# Patient Record
Sex: FEMALE | Race: WHITE | Employment: UNEMPLOYED | URBAN - METROPOLITAN AREA
[De-identification: names, ages, dates, MRNs, and addresses within clinical notes are randomized per-mention and may not be internally consistent; named-entity substitution may affect disease eponyms.]

---

## 2017-03-06 ENCOUNTER — OFFICE VISIT (OUTPATIENT)
Dept: FAMILY MEDICINE CLINIC | Facility: CLINIC | Age: 67
End: 2017-03-06

## 2017-03-06 VITALS
OXYGEN SATURATION: 98 % | HEART RATE: 82 BPM | BODY MASS INDEX: 49.58 KG/M2 | RESPIRATION RATE: 18 BRPM | TEMPERATURE: 98.2 F | WEIGHT: 269.4 LBS | HEIGHT: 62 IN | DIASTOLIC BLOOD PRESSURE: 74 MMHG | SYSTOLIC BLOOD PRESSURE: 128 MMHG

## 2017-03-06 DIAGNOSIS — E66.01 OBESITY, CLASS III, BMI 40-49.9 (MORBID OBESITY) (HCC): ICD-10-CM

## 2017-03-06 DIAGNOSIS — R73.09 ELEVATED HEMOGLOBIN A1C: ICD-10-CM

## 2017-03-06 DIAGNOSIS — E78.1 HYPERTRIGLYCERIDEMIA: ICD-10-CM

## 2017-03-06 DIAGNOSIS — I10 ESSENTIAL HYPERTENSION: Primary | ICD-10-CM

## 2017-03-06 DIAGNOSIS — E55.9 VITAMIN D DEFICIENCY: ICD-10-CM

## 2017-03-06 RX ORDER — DEXTROMETHORPHAN HYDROBROMIDE, GUAIFENESIN 5; 100 MG/5ML; MG/5ML
1300 LIQUID ORAL
COMMUNITY
End: 2018-03-15 | Stop reason: SDUPTHER

## 2017-03-06 NOTE — MR AVS SNAPSHOT
Visit Information Date & Time Provider Department Dept. Phone Encounter #  
 3/6/2017  8:30 AM Jorge Alberto Pickett MD [facility] [phone] [id number] Follow-up Instructions Return in about 3 months (around 6/6/2017) for Follow up hypertension, Follow up hyperlipidemia, Medicare wellness physical, 30 minutes. Upcoming Health Maintenance Date Due  
 MEDICARE YEARLY EXAM 6/8/2017 GLAUCOMA SCREENING Q2Y 10/5/2017 COLONOSCOPY 12/31/2017 BREAST CANCER SCRN MAMMOGRAM 6/14/2018 DTaP/Tdap/Td series (2 - Td) 6/7/2026 Allergies as of 3/6/2017  Review Complete On: 3/6/2017 By: Jorge Alberto Pickett MD  
 No Known Allergies Current Immunizations  Never Reviewed Name Date Influenza Vaccine 9/8/2016, 10/19/2015, 10/23/2014 Influenza Vaccine (Quad) PF 9/29/2015  9:07 AM  
 Pneumococcal Conjugate (PCV-13) 9/8/2016 Pneumococcal Polysaccharide (PPSV-23) 10/23/2014 Not reviewed this visit You Were Diagnosed With   
  
 Codes Comments Essential hypertension    -  Primary ICD-10-CM: I10 
ICD-9-CM: 401.9 Hypertriglyceridemia     ICD-10-CM: E78.1 ICD-9-CM: 272.1 Obesity, Class III, BMI 40-49.9 (morbid obesity) (HCC)     ICD-10-CM: E66.01 
ICD-9-CM: 278.01 Vitamin D deficiency     ICD-10-CM: E55.9 ICD-9-CM: 268.9 Elevated hemoglobin A1c     ICD-10-CM: R73.09 
ICD-9-CM: 790.29 Vitals BP Pulse Temp Resp Height(growth percentile) Weight(growth percentile) 128/74 (BP 1 Location: Right arm, BP Patient Position: Sitting) 82 98.2 °F (36.8 °C) (Oral) 18 5' 2\" (1.575 m) 269 lb 6.4 oz (122.2 kg) SpO2 BMI OB Status Smoking Status 98% 49.27 kg/m2 Menopause Never Smoker BMI and BSA Data Body Mass Index Body Surface Area  
 49.27 kg/m 2 2.31 m 2 Preferred Pharmacy Pharmacy Name Phone [address] [place] [address] [phone] Your Updated Medication List  
  
   
 This list is accurate as of: 3/6/17  8:59 AM.  Always use your most recent med list.  
  
  
  
  
 acetaminophen 650 mg CR tablet Commonly known as:  TYLENOL  
1,300 mg. amLODIPine 5 mg tablet Commonly known as:  Rondall Venkata Take 1 Tab by mouth daily. candesartan 32 mg tablet Commonly known as:  ATACAND Take 1 Tab by mouth daily. metoprolol tartrate 50 mg tablet Commonly known as:  LOPRESSOR Take 1 Tab by mouth two (2) times a day. pravastatin 40 mg tablet Commonly known as:  PRAVACHOL Take 1 Tab by mouth nightly. warfarin 2.5 mg tablet Commonly known as:  COUMADIN Take 1 Tab by mouth daily. Follow-up Instructions Return in about 3 months (around 6/6/2017) for Follow up hypertension, Follow up hyperlipidemia, Medicare wellness physical, 30 minutes. To-Do List   
 03/06/2017 Lab:  HEMOGLOBIN A1C WITH EAG   
  
 03/06/2017 Lab:  T4, FREE   
  
 03/06/2017 Lab:  TSH 3RD GENERATION   
  
 03/06/2017 Lab:  VITAMIN D, 25 HYDROXY   
  
 03/09/2017 Lab:  CBC WITH AUTOMATED DIFF   
  
 03/09/2017 Lab:  LIPID PANEL   
  
 03/09/2017 Lab:  METABOLIC PANEL, COMPREHENSIVE Patient Instructions High Cholesterol: Care Instructions Your Care Instructions Cholesterol is a type of fat in your blood. It is needed for many body functions, such as making new cells. Cholesterol is made by your body. It also comes from food you eat. High cholesterol means that you have too much of the fat in your blood. This raises your risk of a heart attack and stroke. LDL and HDL are part of your total cholesterol. LDL is the \"bad\" cholesterol. High LDL can raise your risk for heart disease, heart attack, and stroke. HDL is the \"good\" cholesterol. It helps clear bad cholesterol from the body. High HDL is linked with a lower risk of heart disease, heart attack, and stroke. Your cholesterol levels help your doctor find out your risk for having a heart attack or stroke. You and your doctor can talk about whether you need to lower your risk and what treatment is best for you. A heart-healthy lifestyle along with medicines can help lower your cholesterol and your risk. The way you choose to lower your risk will depend on how high your risk is for heart attack and stroke. It will also depend on how you feel about taking medicines. Follow-up care is a key part of your treatment and safety. Be sure to make and go to all appointments, and call your doctor if you are having problems. It's also a good idea to know your test results and keep a list of the medicines you take. How can you care for yourself at home? · Eat a variety of foods every day. Good choices include fruits, vegetables, whole grains (like oatmeal), dried beans and peas, nuts and seeds, soy products (like tofu), and fat-free or low-fat dairy products. · Replace butter, margarine, and hydrogenated or partially hydrogenated oils with olive and canola oils. (Canola oil margarine without trans fat is fine.) · Replace red meat with fish, poultry, and soy protein (like tofu). · Limit processed and packaged foods like chips, crackers, and cookies. · Bake, broil, or steam foods. Don't garces them. · Be physically active. Get at least 30 minutes of exercise on most days of the week. Walking is a good choice. You also may want to do other activities, such as running, swimming, cycling, or playing tennis or team sports. · Stay at a healthy weight or lose weight by making the changes in eating and physical activity listed above. Losing just a small amount of weight, even 5 to 10 pounds, can reduce your risk for having a heart attack or stroke. · Do not smoke. When should you call for help? Watch closely for changes in your health, and be sure to contact your doctor if: 
· You need help making lifestyle changes. · You have questions about your medicine. Where can you learn more? Go to http://yaritza-lucy.info/. Enter E114 in the search box to learn more about \"High Cholesterol: Care Instructions. \" Current as of: January 27, 2016 Content Version: 11.1 © 1636-9377 [employer]. Care instructions adapted under license by [employer] (which disclaims liability or warranty for this information). If you have questions about a medical condition or this instruction, always ask your healthcare professional. [address] any warranty or liability for your use of this information. Please provide this summary of care documentation to your next provider. Your primary care clinician is listed as Alice Beasley. If you have any questions after today's visit, please call [phone].

## 2017-03-06 NOTE — PATIENT INSTRUCTIONS

## 2017-03-06 NOTE — PROGRESS NOTES
Internal Medicine Progress Note    Today's Date:  3/6/2017   Patient:  Aby Serbian  Patient :  1950    Subjective:     Chief Complaint   Patient presents with    Hypertension    Cholesterol Problem      Hypertension   This is a chronic problem. BP is at goal. Pt takes lopressor, norvasc and candesartan. Pt reports compliance with these medications. Hyperlipidemia  This is a chronic problem. TG is not at goal. Last FLP was checked on 2016. Pt takes pravastatin. ASCVD risk  is 9.4%. Atrial fibrillation  This is a chronic problem. This is at goal. Pt is on coumadin and a beta blocker. Pt was taken off amiodarone. Pt is seeing Dr. Letha Gutierrez and Dr. Yessenia Rogers. Pt is s/p cardiac ablation and cardioversion. Past Medical History:   Diagnosis Date    Advance directive discussed with patient 2016    Pt would like to appoint her son, Berhane Pina as her medical decision maker in the event that she can no longer do so. Phone number is [phone]. Her secondary person is her niece, Gabriella Mancini. Phone number is [phone]. If her death is imminent and medical treatment will not help her recover, pt does not want life prolonging measures. If her condition makes her unaware of h    Dizziness 5/3/2016    Hypertriglyceridemia 2016    Obesity, Class III, BMI 40-49.9 (morbid obesity) ([facility] [address] 2015    Osteoarthritis     Shingles 2015     History reviewed. No pertinent surgical history. reports that she has never smoked. She has never used smokeless tobacco. She reports that she does not drink alcohol or use illicit drugs. Family History   Problem Relation Age of Onset    Heart Disease Father      unknown, ? MI at age 46    Arthritis-osteo Sister      No Known Allergies  Review of Systems   Positives in bold  CV:      chest pain, palpitations  PULM:  SOB, wheezing, cough, sputum production    Current Outpatient Meds and Allergies     Current Outpatient Prescriptions on File Prior to Visit   Medication Sig Dispense Refill    amLODIPine (NORVASC) 5 mg tablet Take 1 Tab by mouth daily. 90 Tab 3    warfarin (COUMADIN) 2.5 mg tablet Take 1 Tab by mouth daily. 90 Tab 3    pravastatin (PRAVACHOL) 40 mg tablet Take 1 Tab by mouth nightly. 90 Tab 3    candesartan (ATACAND) 32 mg tablet Take 1 Tab by mouth daily. 90 Tab 3    metoprolol tartrate (LOPRESSOR) 50 mg tablet Take 1 Tab by mouth two (2) times a day. (Patient taking differently: Take 75 mg by mouth two (2) times a day.) 180 Tab 3     No current facility-administered medications on file prior to visit. No Known Allergies  Objective:     VS:    Visit Vitals    /74 (BP 1 Location: Right arm, BP Patient Position: Sitting)  Comment: manual    Pulse 82    Temp 98.2 °F (36.8 °C) (Oral)    Resp 18    Ht 5' 2\" (1.575 m)    Wt 269 lb 6.4 oz (122.2 kg)    SpO2 98%    BMI 49.27 kg/m2     General:   Well-nourished, well-groomed, pleasant, alert, in no acute distress  Head:  Normocephalic, atraumatic  Ears:  External ears WNL  Nose:  External nares WNL  Psych:  No pressured speech, no abnormal thought content    No visits with results within 3 Month(s) from this visit.   Latest known visit with results is:    Hospital Outpatient Visit on 12/06/2016   Component Date Value Ref Range Status    Sodium 12/06/2016 141  136 - 145 mmol/L Final    Potassium 12/06/2016 4.3  3.5 - 5.5 mmol/L Final    Chloride 12/06/2016 103  100 - 108 mmol/L Final    CO2 12/06/2016 30  21 - 32 mmol/L Final    Anion gap 12/06/2016 8  3.0 - 18 mmol/L Final    Glucose 12/06/2016 94  74 - 99 mg/dL Final    BUN 12/06/2016 14  7.0 - 18 MG/DL Final    Creatinine 12/06/2016 0.71  0.6 - 1.3 MG/DL Final    BUN/Creatinine ratio 12/06/2016 20  12 - 20   Final    GFR est AA 12/06/2016 >60  >60 ml/min/1.73m2 Final    GFR est non-AA 12/06/2016 >60  >60 ml/min/1.73m2 Final    Comment: (NOTE)  Estimated GFR is calculated using the Modification of Diet in Renal   Disease (MDRD) Study equation, reported for both  Americans   (GFRAA) and non- Americans (GFRNA), and normalized to 1.73m2   body surface area. The physician must decide which value applies to   the patient. The MDRD study equation should only be used in   individuals age 25 or older. It has not been validated for the   following: pregnant women, patients with serious comorbid conditions,   or on certain medications, or persons with extremes of body size,   muscle mass, or nutritional status.  Calcium 12/06/2016 9.3  8.5 - 10.1 MG/DL Final    Bilirubin, total 12/06/2016 0.6  0.2 - 1.0 MG/DL Final    ALT (SGPT) 12/06/2016 29  13 - 56 U/L Final    AST (SGOT) 12/06/2016 24  15 - 37 U/L Final    Alk. phosphatase 12/06/2016 81  45 - 117 U/L Final    Protein, total 12/06/2016 6.6  6.4 - 8.2 g/dL Final    Albumin 12/06/2016 3.8  3.4 - 5.0 g/dL Final    Globulin 12/06/2016 2.8  2.0 - 4.0 g/dL Final    A-G Ratio 12/06/2016 1.4  0.8 - 1.7   Final    LIPID PROFILE 12/06/2016        Final    Cholesterol, total 12/06/2016 208* <200 MG/DL Final    Triglyceride 12/06/2016 320* <150 MG/DL Final    HDL Cholesterol 12/06/2016 45  40 - 60 MG/DL Final    LDL, calculated 12/06/2016 99  0 - 100 MG/DL Final    VLDL, calculated 12/06/2016 64  MG/DL Final    CHOL/HDL Ratio 12/06/2016 4.6  0 - 5.0   Final     Assessment/Plan & Orders:         ICD-10-CM ICD-9-CM    1. Essential hypertension I10 401.9 CBC WITH AUTOMATED DIFF      TSH 3RD GENERATION      T4, FREE   2. Hypertriglyceridemia E78.1 272.1 LIPID PANEL      METABOLIC PANEL, COMPREHENSIVE   3. Obesity, Class III, BMI 40-49.9 (morbid obesity) (HCC) E66.01 278.01    4.  Vitamin D deficiency E55.9 268.9 VITAMIN D, 25 HYDROXY   5. Elevated hemoglobin A1c R73.09 790.29 HEMOGLOBIN A1C WITH EAG      Healthy lifestyle has been encouraged including avoidance of tobacco, limiting or avoiding alcohol intake, heart healthy diet which is low in cholesterol and saturated fat and contains fresh fruits, vegetables and whole grains and fiber, regular exercise with goals of 20-30 minutes 3-5 days weekly and maintaining an optimal BMI. Follow-up Disposition:  Return in about 3 months (around 6/6/2017) for Follow up hypertension, Follow up hyperlipidemia, Medicare wellness physical, 30 minutes. *Patient verbalized understanding and agreement with the plan. Patient was given an after-visit summary. Maria Del Carmen Schuler.  Anil Amezcua MD - Internal Medicine  3/6/2017, 9:38 AM  [facility]  [address] [place] [address] [place], [address]  Phone [phone]  Fax [phone]

## 2017-03-06 NOTE — PROGRESS NOTES
Kevin Brewster is a 75 y.o. female presents today for follow up on her hypertension and cholesterol. She would also like to discuss medication changes from cardiologist. Patient is fasting. Pt is in Room # 2        1. Have you been to the ER, urgent care clinic since your last visit? Hospitalized since your last visit? No    2. Have you seen or consulted any other health care providers outside of the [address] [place] [address] since your last visit? Include any pap smears or colon screening.  Yes When: last month Where: Dr. Rios Monday and Dr. Tracy Austin Reason for visit: routine check- medication changes with cardiologist

## 2017-06-13 ENCOUNTER — OFFICE VISIT (OUTPATIENT)
Dept: FAMILY MEDICINE CLINIC | Facility: CLINIC | Age: 67
End: 2017-06-13

## 2017-06-13 ENCOUNTER — HOSPITAL ENCOUNTER (OUTPATIENT)
Dept: LAB | Age: 67
Discharge: HOME | End: 2017-06-13
Payer: MEDICARE

## 2017-06-13 VITALS
OXYGEN SATURATION: 95 % | DIASTOLIC BLOOD PRESSURE: 70 MMHG | HEIGHT: 62 IN | SYSTOLIC BLOOD PRESSURE: 135 MMHG | BODY MASS INDEX: 46.74 KG/M2 | WEIGHT: 254 LBS | TEMPERATURE: 98.8 F | RESPIRATION RATE: 18 BRPM | HEART RATE: 67 BPM

## 2017-06-13 DIAGNOSIS — Z78.0 POSTMENOPAUSAL: ICD-10-CM

## 2017-06-13 DIAGNOSIS — E78.1 HYPERTRIGLYCERIDEMIA: ICD-10-CM

## 2017-06-13 DIAGNOSIS — Z23 NEED FOR SHINGLES VACCINE: ICD-10-CM

## 2017-06-13 DIAGNOSIS — Z12.11 COLON CANCER SCREENING: ICD-10-CM

## 2017-06-13 DIAGNOSIS — J02.9 PHARYNGITIS, UNSPECIFIED ETIOLOGY: ICD-10-CM

## 2017-06-13 DIAGNOSIS — E66.9 OBESITY, UNSPECIFIED OBESITY SEVERITY, UNSPECIFIED OBESITY TYPE: ICD-10-CM

## 2017-06-13 DIAGNOSIS — I10 ESSENTIAL HYPERTENSION: ICD-10-CM

## 2017-06-13 DIAGNOSIS — Z00.00 ROUTINE GENERAL MEDICAL EXAMINATION AT A HEALTH CARE FACILITY: Primary | ICD-10-CM

## 2017-06-13 DIAGNOSIS — R53.83 FATIGUE, UNSPECIFIED TYPE: ICD-10-CM

## 2017-06-13 DIAGNOSIS — I48.20 CHRONIC ATRIAL FIBRILLATION (HCC): ICD-10-CM

## 2017-06-13 LAB
25(OH)D3 SERPL-MCNC: 44.2 NG/ML (ref 30–100)
ALBUMIN SERPL BCP-MCNC: 3.9 G/DL (ref 3.4–5)
ALBUMIN/GLOB SERPL: 1.3 {RATIO} (ref 0.8–1.7)
ALP SERPL-CCNC: 78 U/L (ref 45–117)
ALT SERPL-CCNC: 25 U/L (ref 13–56)
ANION GAP BLD CALC-SCNC: 8 MMOL/L (ref 3–18)
AST SERPL W P-5'-P-CCNC: 20 U/L (ref 15–37)
BASOPHILS # BLD AUTO: 0 K/UL (ref 0–0.06)
BASOPHILS # BLD: 0 % (ref 0–2)
BILIRUB SERPL-MCNC: 0.6 MG/DL (ref 0.2–1)
BUN SERPL-MCNC: 11 MG/DL (ref 7–18)
BUN/CREAT SERPL: 16 (ref 12–20)
CALCIUM SERPL-MCNC: 8.6 MG/DL (ref 8.5–10.1)
CHLORIDE SERPL-SCNC: 107 MMOL/L (ref 100–108)
CHOLEST SERPL-MCNC: 180 MG/DL
CO2 SERPL-SCNC: 27 MMOL/L (ref 21–32)
CREAT SERPL-MCNC: 0.7 MG/DL (ref 0.6–1.3)
DIFFERENTIAL METHOD BLD: ABNORMAL
EOSINOPHIL # BLD: 0.1 K/UL (ref 0–0.4)
EOSINOPHIL NFR BLD: 1 % (ref 0–5)
ERYTHROCYTE [DISTWIDTH] IN BLOOD BY AUTOMATED COUNT: 13.8 % (ref 11.6–14.5)
EST. AVERAGE GLUCOSE BLD GHB EST-MCNC: 105 MG/DL
GLOBULIN SER CALC-MCNC: 2.9 G/DL (ref 2–4)
GLUCOSE SERPL-MCNC: 96 MG/DL (ref 74–99)
HBA1C MFR BLD: 5.3 % (ref 4.2–5.6)
HCT VFR BLD AUTO: 44.4 % (ref 35–45)
HDLC SERPL-MCNC: 44 MG/DL (ref 40–60)
HDLC SERPL: 4.1 {RATIO} (ref 0–5)
HGB BLD-MCNC: 14.6 G/DL (ref 12–16)
INR BLD: 2.3
LDLC SERPL CALC-MCNC: 105 MG/DL (ref 0–100)
LIPID PROFILE,FLP: ABNORMAL
LYMPHOCYTES # BLD AUTO: 18 % (ref 21–52)
LYMPHOCYTES # BLD: 1.5 K/UL (ref 0.9–3.6)
MCH RBC QN AUTO: 31.1 PG (ref 24–34)
MCHC RBC AUTO-ENTMCNC: 32.9 G/DL (ref 31–37)
MCV RBC AUTO: 94.5 FL (ref 74–97)
MONOCYTES # BLD: 0.7 K/UL (ref 0.05–1.2)
MONOCYTES NFR BLD AUTO: 9 % (ref 3–10)
NEUTS SEG # BLD: 5.8 K/UL (ref 1.8–8)
NEUTS SEG NFR BLD AUTO: 72 % (ref 40–73)
PLATELET # BLD AUTO: 321 K/UL (ref 135–420)
PMV BLD AUTO: 10.3 FL (ref 9.2–11.8)
POTASSIUM SERPL-SCNC: 4 MMOL/L (ref 3.5–5.5)
PROT SERPL-MCNC: 6.8 G/DL (ref 6.4–8.2)
PT POC: NORMAL SECONDS
RBC # BLD AUTO: 4.7 M/UL (ref 4.2–5.3)
S PYO AG THROAT QL: NEGATIVE
SODIUM SERPL-SCNC: 142 MMOL/L (ref 136–145)
TRIGL SERPL-MCNC: 155 MG/DL (ref ?–150)
TSH SERPL DL<=0.05 MIU/L-ACNC: 0.98 UIU/ML (ref 0.36–3.74)
VALID INTERNAL CONTROL?: YES
VALID INTERNAL CONTROL?: YES
VLDLC SERPL CALC-MCNC: 31 MG/DL
WBC # BLD AUTO: 8 K/UL (ref 4.6–13.2)

## 2017-06-13 PROCEDURE — 83036 HEMOGLOBIN GLYCOSYLATED A1C: CPT | Performed by: PHYSICIAN ASSISTANT

## 2017-06-13 PROCEDURE — 84443 ASSAY THYROID STIM HORMONE: CPT | Performed by: PHYSICIAN ASSISTANT

## 2017-06-13 PROCEDURE — 84439 ASSAY OF FREE THYROXINE: CPT | Performed by: PHYSICIAN ASSISTANT

## 2017-06-13 PROCEDURE — 80061 LIPID PANEL: CPT | Performed by: PHYSICIAN ASSISTANT

## 2017-06-13 PROCEDURE — 85025 COMPLETE CBC W/AUTO DIFF WBC: CPT | Performed by: PHYSICIAN ASSISTANT

## 2017-06-13 PROCEDURE — 80053 COMPREHEN METABOLIC PANEL: CPT | Performed by: PHYSICIAN ASSISTANT

## 2017-06-13 PROCEDURE — 36415 COLL VENOUS BLD VENIPUNCTURE: CPT | Performed by: PHYSICIAN ASSISTANT

## 2017-06-13 PROCEDURE — 82306 VITAMIN D 25 HYDROXY: CPT | Performed by: PHYSICIAN ASSISTANT

## 2017-06-13 RX ORDER — AMOXICILLIN 500 MG/1
500 CAPSULE ORAL 2 TIMES DAILY
Qty: 20 CAP | Refills: 0 | Status: SHIPPED | OUTPATIENT
Start: 2017-06-13 | End: 2017-06-23

## 2017-06-13 NOTE — PROGRESS NOTES
Lary Acuna is a 75 y.o. female presents today for sore throat for 2 days. Learning Assessment (baseline): Completed  Depression Screening: Completed    1. Have you been to the ER, urgent care clinic since your last visit? Hospitalized since your last visit? No    2. Have you seen or consulted any other health care providers outside of the [address] [place] [address] since your last visit? Include any pap smears or colon screening. No     Health Maintenance reviewed - sick visit today.

## 2017-06-13 NOTE — MR AVS SNAPSHOT
Visit Information Date & Time Provider Department Dept. Phone Encounter #  
 6/13/2017  9:15 AM Jared Dickey MARY [facility] [phone] [id number] Follow-up Instructions Return in about 3 months (around 9/13/2017) for routine care with Dr. Sonia Shannon. Your Appointments 7/13/2017  9:30 AM  
Follow Up with Rosa Collier MD  
[facility]) Appt Note: FOLLOW-UP; pt r/s 06/06/17 appt  syb 04/17/17; [address] [facility] [phone]  
[address] [place] [address] Upcoming Health Maintenance Date Due  
 MEDICARE YEARLY EXAM 6/8/2017 INFLUENZA AGE 9 TO ADULT 8/1/2017 GLAUCOMA SCREENING Q2Y 10/5/2017 COLONOSCOPY 12/31/2017 BREAST CANCER SCRN MAMMOGRAM 6/14/2018 DTaP/Tdap/Td series (2 - Td) 6/7/2026 Allergies as of 6/13/2017  Review Complete On: 3/6/2017 By: Rosa Collier MD  
 No Known Allergies Current Immunizations  Never Reviewed Name Date Influenza Vaccine 9/8/2016, 10/19/2015, 10/23/2014 Influenza Vaccine (Quad) PF 9/29/2015  9:07 AM  
 Pneumococcal Conjugate (PCV-13) 9/8/2016 Pneumococcal Polysaccharide (PPSV-23) 10/23/2014 Not reviewed this visit You Were Diagnosed With   
  
 Codes Comments Essential hypertension    -  Primary ICD-10-CM: I10 
ICD-9-CM: 401.9 Hypertriglyceridemia     ICD-10-CM: E78.1 ICD-9-CM: 272.1 Chronic atrial fibrillation (HCC)     ICD-10-CM: A45.8 ICD-9-CM: 427.31 Colon cancer screening     ICD-10-CM: Z12.11 ICD-9-CM: V76.51 Need for shingles vaccine     ICD-10-CM: T20 ICD-9-CM: V04.89 Pharyngitis, unspecified etiology     ICD-10-CM: J02.9 ICD-9-CM: 873 Routine general medical examination at a health care facility     ICD-10-CM: Z00.00 ICD-9-CM: V70.0 Fatigue, unspecified type     ICD-10-CM: R53.83 ICD-9-CM: 780.79   
 Postmenopausal     ICD-10-CM: Z78.0 ICD-9-CM: V49.81 Obesity, unspecified obesity severity, unspecified obesity type     ICD-10-CM: E66.9 ICD-9-CM: 278.00 Vitals BP Pulse Temp Resp Height(growth percentile) Weight(growth percentile) 135/70 (BP 1 Location: Left arm, BP Patient Position: Sitting) 67 98.8 °F (37.1 °C) (Oral) 18 5' 2\" (1.575 m) 254 lb (115.2 kg) SpO2 BMI OB Status Smoking Status 95% 46.46 kg/m2 Menopause Never Smoker Vitals History BMI and BSA Data Body Mass Index Body Surface Area  
 46.46 kg/m 2 2.24 m 2 Preferred Pharmacy Pharmacy Name Phone [address] [place] [address] [phone] Your Updated Medication List  
  
   
This list is accurate as of: 17  9:39 AM.  Always use your most recent med list.  
  
  
  
  
 acetaminophen 650 mg CR tablet Commonly known as:  TYLENOL  
1,300 mg. amLODIPine 5 mg tablet Commonly known as:  Eneida Coop Take 1 Tab by mouth daily. amoxicillin 500 mg capsule Commonly known as:  AMOXIL Take 1 Cap by mouth two (2) times a day for 10 days. candesartan 32 mg tablet Commonly known as:  ATACAND Take 1 Tab by mouth daily. metoprolol tartrate 50 mg tablet Commonly known as:  LOPRESSOR Take 1 Tab by mouth two (2) times a day. pravastatin 40 mg tablet Commonly known as:  PRAVACHOL Take 1 Tab by mouth nightly. varicella zoster vacine live 19,400 unit/0.65 mL Susr injection Commonly known as:  ZOSTAVAX  
1 Vial by SubCUTAneous route once for 1 dose. warfarin 2.5 mg tablet Commonly known as:  COUMADIN Take 1 Tab by mouth daily. Prescriptions Printed Refills  
 varicella zoster vacine live (ZOSTAVAX) 19,400 unit/0.65 mL susr injection 0 Si Vial by SubCUTAneous route once for 1 dose. Class: Print  Route: SubCUTAneous  
 amoxicillin (AMOXIL) 500 mg capsule 0  
 Sig: Take 1 Cap by mouth two (2) times a day for 10 days. Class: Print Route: Oral  
  
We Performed the Following REFERRAL FOR COLONOSCOPY [ZCT724 Custom] Comments:  
 colonoscopy Follow-up Instructions Return in about 3 months (around 9/13/2017) for routine care with Dr. Freya Valadez. To-Do List   
 06/13/2017 Lab:  CBC WITH AUTOMATED DIFF   
  
 06/13/2017 Lab:  HEMOGLOBIN A1C WITH EAG   
  
 06/13/2017 Lab:  LIPID PANEL   
  
 06/13/2017 Lab:  METABOLIC PANEL, COMPREHENSIVE   
  
 06/13/2017 Lab:  T4, FREE   
  
 06/13/2017 Lab:  TSH 3RD GENERATION   
  
 06/13/2017 Lab:  VITAMIN D, 25 HYDROXY Referral Information Referral ID Referred By Referred To  
  
 [id number] Ricki Yancey MD   
   [address] [place] [address] Phone: [phone] Fax: [phone] Visits Status Start Date End Date 1 New Request 6/13/17 6/13/18 If your referral has a status of pending review or denied, additional information will be sent to support the outcome of this decision. Patient Instructions Atrial Fibrillation: Care Instructions Your Care Instructions Atrial fibrillation is an irregular and often fast heartbeat. Treating this condition is important for several reasons. It can cause blood clots, which can travel from your heart to your brain and cause a stroke. If you have a fast heartbeat, you may feel lightheaded, dizzy, and weak. An irregular heartbeat can also increase your risk for heart failure. Atrial fibrillation is often the result of another heart condition, such as high blood pressure or coronary artery disease. Making changes to improve your heart condition will help you stay healthy and active. Follow-up care is a key part of your treatment and safety.  Be sure to make and go to all appointments, and call your doctor if you are having problems. It's also a good idea to know your test results and keep a list of the medicines you take. How can you care for yourself at home? Medicines · Take your medicines exactly as prescribed. Call your doctor if you think you are having a problem with your medicine. You will get more details on the specific medicines your doctor prescribes. · If your doctor has given you a blood thinner to prevent a stroke, be sure you get instructions about how to take your medicine safely. Blood thinners can cause serious bleeding problems. · Do not take any vitamins, over-the-counter drugs, or herbal products without talking to your doctor first. 
Lifestyle changes · Do not smoke. Smoking can increase your chance of a stroke and heart attack. If you need help quitting, talk to your doctor about stop-smoking programs and medicines. These can increase your chances of quitting for good. · Eat a heart-healthy diet. · Stay at a healthy weight. Lose weight if you need to. · Limit alcohol to 2 drinks a day for men and 1 drink a day for women. Too much alcohol can cause health problems. · Avoid colds and flu. Get a pneumococcal vaccine shot. If you have had one before, ask your doctor whether you need another dose. Get a flu shot every year. If you must be around people with colds or flu, wash your hands often. Activity · If your doctor recommends it, get more exercise. Walking is a good choice. Bit by bit, increase the amount you walk every day. Try for at least 30 minutes on most days of the week. You also may want to swim, bike, or do other activities. Your doctor may suggest that you join a cardiac rehabilitation program so that you can have help increasing your physical activity safely. · Start light exercise if your doctor says it is okay. Even a small amount will help you get stronger, have more energy, and manage stress. Walking is an easy way to get exercise.  Start out by walking a little more than you did in the hospital. Gradually increase the amount you walk. · When you exercise, watch for signs that your heart is working too hard. You are pushing too hard if you cannot talk while you are exercising. If you become short of breath or dizzy or have chest pain, sit down and rest immediately. · Check your pulse regularly. Place two fingers on the artery at the palm side of your wrist, in line with your thumb. If your heartbeat seems uneven or fast, talk to your doctor. When should you call for help? Call 911 anytime you think you may need emergency care. For example, call if: 
· You have symptoms of a heart attack. These may include: ¨ Chest pain or pressure, or a strange feeling in the chest. 
¨ Sweating. ¨ Shortness of breath. ¨ Nausea or vomiting. ¨ Pain, pressure, or a strange feeling in the back, neck, jaw, or upper belly or in one or both shoulders or arms. ¨ Lightheadedness or sudden weakness. ¨ A fast or irregular heartbeat. After you call 911, the  may tell you to chew 1 adult-strength or 2 to 4 low-dose aspirin. Wait for an ambulance. Do not try to drive yourself. · You have symptoms of a stroke. These may include: 
¨ Sudden numbness, tingling, weakness, or loss of movement in your face, arm, or leg, especially on only one side of your body. ¨ Sudden vision changes. ¨ Sudden trouble speaking. ¨ Sudden confusion or trouble understanding simple statements. ¨ Sudden problems with walking or balance. ¨ A sudden, severe headache that is different from past headaches. · You passed out (lost consciousness). Call your doctor now or seek immediate medical care if: 
· You have new or increased shortness of breath. · You feel dizzy or lightheaded, or you feel like you may faint. · Your heart rate becomes irregular. · You can feel your heart flutter in your chest or skip heartbeats. Tell your doctor if these symptoms are new or worse. Watch closely for changes in your health, and be sure to contact your doctor if you have any problems. Where can you learn more? Go to http://yaritza-lucy.info/. Enter U020 in the search box to learn more about \"Atrial Fibrillation: Care Instructions. \" Current as of: January 27, 2016 Content Version: 11.2 © 6776-5160 [employer]. Care instructions adapted under license by [employer] (which disclaims liability or warranty for this information). If you have questions about a medical condition or this instruction, always ask your healthcare professional. [address] any warranty or liability for your use of this information. Please provide this summary of care documentation to your next provider. Your primary care clinician is listed as Marilin Betty. If you have any questions after today's visit, please call [phone].

## 2017-06-13 NOTE — PATIENT INSTRUCTIONS
Atrial Fibrillation: Care Instructions  Your Care Instructions    Atrial fibrillation is an irregular and often fast heartbeat. Treating this condition is important for several reasons. It can cause blood clots, which can travel from your heart to your brain and cause a stroke. If you have a fast heartbeat, you may feel lightheaded, dizzy, and weak. An irregular heartbeat can also increase your risk for heart failure. Atrial fibrillation is often the result of another heart condition, such as high blood pressure or coronary artery disease. Making changes to improve your heart condition will help you stay healthy and active. Follow-up care is a key part of your treatment and safety. Be sure to make and go to all appointments, and call your doctor if you are having problems. It's also a good idea to know your test results and keep a list of the medicines you take. How can you care for yourself at home? Medicines  · Take your medicines exactly as prescribed. Call your doctor if you think you are having a problem with your medicine. You will get more details on the specific medicines your doctor prescribes. · If your doctor has given you a blood thinner to prevent a stroke, be sure you get instructions about how to take your medicine safely. Blood thinners can cause serious bleeding problems. · Do not take any vitamins, over-the-counter drugs, or herbal products without talking to your doctor first.  Lifestyle changes  · Do not smoke. Smoking can increase your chance of a stroke and heart attack. If you need help quitting, talk to your doctor about stop-smoking programs and medicines. These can increase your chances of quitting for good. · Eat a heart-healthy diet. · Stay at a healthy weight. Lose weight if you need to. · Limit alcohol to 2 drinks a day for men and 1 drink a day for women. Too much alcohol can cause health problems. · Avoid colds and flu. Get a pneumococcal vaccine shot.  If you have had one before, ask your doctor whether you need another dose. Get a flu shot every year. If you must be around people with colds or flu, wash your hands often. Activity  · If your doctor recommends it, get more exercise. Walking is a good choice. Bit by bit, increase the amount you walk every day. Try for at least 30 minutes on most days of the week. You also may want to swim, bike, or do other activities. Your doctor may suggest that you join a cardiac rehabilitation program so that you can have help increasing your physical activity safely. · Start light exercise if your doctor says it is okay. Even a small amount will help you get stronger, have more energy, and manage stress. Walking is an easy way to get exercise. Start out by walking a little more than you did in the hospital. Gradually increase the amount you walk. · When you exercise, watch for signs that your heart is working too hard. You are pushing too hard if you cannot talk while you are exercising. If you become short of breath or dizzy or have chest pain, sit down and rest immediately. · Check your pulse regularly. Place two fingers on the artery at the palm side of your wrist, in line with your thumb. If your heartbeat seems uneven or fast, talk to your doctor. When should you call for help? Call 911 anytime you think you may need emergency care. For example, call if:  · You have symptoms of a heart attack. These may include:  ¨ Chest pain or pressure, or a strange feeling in the chest.  ¨ Sweating. ¨ Shortness of breath. ¨ Nausea or vomiting. ¨ Pain, pressure, or a strange feeling in the back, neck, jaw, or upper belly or in one or both shoulders or arms. ¨ Lightheadedness or sudden weakness. ¨ A fast or irregular heartbeat. After you call 911, the  may tell you to chew 1 adult-strength or 2 to 4 low-dose aspirin. Wait for an ambulance. Do not try to drive yourself. · You have symptoms of a stroke.  These may include:  ¨ Sudden numbness, tingling, weakness, or loss of movement in your face, arm, or leg, especially on only one side of your body. ¨ Sudden vision changes. ¨ Sudden trouble speaking. ¨ Sudden confusion or trouble understanding simple statements. ¨ Sudden problems with walking or balance. ¨ A sudden, severe headache that is different from past headaches. · You passed out (lost consciousness). Call your doctor now or seek immediate medical care if:  · You have new or increased shortness of breath. · You feel dizzy or lightheaded, or you feel like you may faint. · Your heart rate becomes irregular. · You can feel your heart flutter in your chest or skip heartbeats. Tell your doctor if these symptoms are new or worse. Watch closely for changes in your health, and be sure to contact your doctor if you have any problems. Where can you learn more? Go to http://yaritza-lucy.info/. Enter U020 in the search box to learn more about \"Atrial Fibrillation: Care Instructions. \"  Current as of: January 27, 2016  Content Version: 11.2  © 6851-5866 [employer]. Care instructions adapted under license by [employer] (which disclaims liability or warranty for this information). If you have questions about a medical condition or this instruction, always ask your healthcare professional. [address] any warranty or liability for your use of this information.

## 2017-06-13 NOTE — PROGRESS NOTES
This is a Subsequent Medicare Annual Wellness Visit providing Personalized Prevention Plan Services (PPPS) (Performed 12 months after initial AWV and PPPS )    I have reviewed the patient's medical history in detail and updated the computerized patient record. History     Past Medical History:   Diagnosis Date    Advance directive discussed with patient 6/7/2016    Pt would like to appoint her son, Sierra Dimas as her medical decision maker in the event that she can no longer do so. Phone number is [phone]. Her secondary person is her niece, Jessica Clark. Phone number is [phone]. If her death is imminent and medical treatment will not help her recover, pt does not want life prolonging measures. If her condition makes her unaware of h    Dizziness 5/3/2016    Hypertriglyceridemia 9/6/2016    Obesity, Class III, BMI 40-49.9 (morbid obesity) ([facility] [address] 6/29/2015    Osteoarthritis     Shingles 9/29/2015      History reviewed. No pertinent surgical history. Current Outpatient Prescriptions   Medication Sig Dispense Refill    varicella zoster vacine live (ZOSTAVAX) 19,400 unit/0.65 mL susr injection 1 Vial by SubCUTAneous route once for 1 dose. 0.65 mL 0    amoxicillin (AMOXIL) 500 mg capsule Take 1 Cap by mouth two (2) times a day for 10 days. 20 Cap 0    acetaminophen (TYLENOL) 650 mg CR tablet 1,300 mg.      amLODIPine (NORVASC) 5 mg tablet Take 1 Tab by mouth daily. 90 Tab 3    warfarin (COUMADIN) 2.5 mg tablet Take 1 Tab by mouth daily. 90 Tab 3    pravastatin (PRAVACHOL) 40 mg tablet Take 1 Tab by mouth nightly. 90 Tab 3    candesartan (ATACAND) 32 mg tablet Take 1 Tab by mouth daily. 90 Tab 3    metoprolol tartrate (LOPRESSOR) 50 mg tablet Take 1 Tab by mouth two (2) times a day. (Patient taking differently: Take 75 mg by mouth two (2) times a day.) 180 Tab 3     No Known Allergies  Family History   Problem Relation Age of Onset    Heart Disease Father      unknown, ? MI at age 46    Arthritis-osteo Sister      Social History   Substance Use Topics    Smoking status: Never Smoker    Smokeless tobacco: Never Used    Alcohol use No     Patient Active Problem List   Diagnosis Code    Aortic valve stenosis I35.0    Atrial fibrillation (HCC) I48.91    Hypertension I10    Obesity, Class III, BMI 40-49.9 (morbid obesity) [facility] E66.01    Osteoarthritis M19.90    Advance directive discussed with patient Z70.80    Hypertriglyceridemia E78.1       Depression Risk Factor Screening:     PHQ over the last two weeks 6/13/2017   PHQ Not Done Active Diagnosis of Depression or Bipolar Disorder   Little interest or pleasure in doing things Not at all   Feeling down, depressed or hopeless Not at all   Total Score PHQ 2 0     Alcohol Risk Factor Screening:   Does not drink      Functional Ability and Level of Safety:     Hearing Loss   none    Activities of Daily Living   Self-care. Requires assistance with: no ADLs    Fall Risk     Fall Risk Assessment, last 12 mths 6/13/2017   Able to walk? Yes   Fall in past 12 months?  No     Abuse Screen   Patient is not abused    Review of Systems   A comprehensive review of systems was negative except for: Ears, nose, mouth, throat, and face: positive for sore throat    Physical Examination     Evaluation of Cognitive Function:  Mood/affect:  neutral, happy  Appearance: age appropriate and casually dressed  Family member/caregiver input: none present    Visit Vitals    /70 (BP 1 Location: Left arm, BP Patient Position: Sitting)    Pulse 67    Temp 98.8 °F (37.1 °C) (Oral)    Resp 18    Ht 5' 2\" (1.575 m)    Wt 254 lb (115.2 kg)    SpO2 95%    BMI 46.46 kg/m2     Card: 2/6 murmur, RRR no rubs or galluips  Pulm: CTAB    Patient Care Team:  Tara Essex, MD as PCP - General (Internal Medicine)  Ravinder Motta MD (Cardiology)  Mateo Villalobos DO (Cardiothoracic Surgery)    Advice/Referrals/Counseling   Education and counseling provided:  Are appropriate based on today's review and evaluation  End-of-Life planning (with patient's consent)      Glaucoma Screening- UTD  Pneumonia Vaccine- UTD  Shingles Vaccine- is due, script given  Tdap Vaccine- is due, declined  Colonoscopy- is needed; referral placed  Mammogram- is needed; scheduled for this week  DEXA- patient declines to have another done  Advance Directive- on file; still valid per patient    Assessment/Plan       ICD-10-CM ICD-9-CM    1. Routine general medical examination at a health care facility Z00.00 V70.0    2. Essential hypertension X41 264.6 METABOLIC PANEL, COMPREHENSIVE   3. Hypertriglyceridemia E78.1 272.1 LIPID PANEL   4. Chronic atrial fibrillation (HCC) I48.2 427.31 AMB POC PT/INR   5. Pharyngitis, unspecified etiology J02.9 462 amoxicillin (AMOXIL) 500 mg capsule      AMB POC RAPID STREP A   6. Fatigue, unspecified type R53.83 780.79 CBC WITH AUTOMATED DIFF      TSH 3RD GENERATION      T4, FREE   7. Postmenopausal Z78.0 V49.81 VITAMIN D, 25 HYDROXY   8. Obesity, unspecified obesity severity, unspecified obesity type E66.9 278.00 HEMOGLOBIN A1C WITH EAG   9. Colon cancer screening Z12.11 V76.51 REFERRAL FOR COLONOSCOPY   10. Need for shingles vaccine Z23 V04.89 varicella zoster vacine live (ZOSTAVAX) 19,400 unit/0.65 mL susr injection     Follow-up Disposition:  Return in about 3 months (around 9/13/2017) for routine care with Dr. Hyun Simon. .      Patient and I had a discussion and agreed on a 5 year plan for her for health screening and maintenance.       JOSHUA Winter

## 2017-06-13 NOTE — PROGRESS NOTES
History and Physical    Patient: Theo Clancy MRN: [id number]  SSN: [id number]    YOB: 1950  Age: 75 y.o. Sex: female      Subjective:      Theo Clancy is a 75 y.o. female who presents today for follow up HTN, HLD, h/o afib etc.    In terms of her HTN, she is on metoprolol, norvasc and candesartan. BP wnl today. In terms of her HLD, she is on pravastatin. Reports compliance. In terms of her afib, she sees cardiology. On coumadin. Is s/p cardiac ablation and cardioversion. Coumadin managed by coumadin clinic. Patient also has a c/o 2 day h/o right sided sore throat with tender lymph nodes under her chin. Throat is painful to swallow. Denies known ill contacts. Last Colonoscopy: 2007- referral placed today  Last DEXA:  7/2014-patient declined further testing  Last Mammogram: 6/16-Bi-RADS 1- has appt this week  Last PAP:  Past screening age      PMH:  Past Medical History:   Diagnosis Date    Advance directive discussed with patient 6/7/2016    Pt would like to appoint her son, Kalen Sylvester as her medical decision maker in the event that she can no longer do so. Phone number is [phone]. Her secondary person is her niece, Alvina Snider. Phone number is [phone]. If her death is imminent and medical treatment will not help her recover, pt does not want life prolonging measures. If her condition makes her unaware of h    Dizziness 5/3/2016    Hypertriglyceridemia 9/6/2016    Obesity, Class III, BMI 40-49.9 (morbid obesity) ([facility] [address] 6/29/2015    Osteoarthritis     Shingles 9/29/2015     History reviewed. No pertinent surgical history. FamHx:  Family History   Problem Relation Age of Onset    Heart Disease Father      unknown, ? MI at age 46    [de-identified] Sister        SocialHx:  Social History   Substance Use Topics    Smoking status: Never Smoker    Smokeless tobacco: Never Used    Alcohol use No        Meds:  Prior to Admission medications    Medication Sig Start Date End Date Taking? Authorizing Provider   varicella zoster rob live (ZOSTAVAX) 19,400 unit/0.65 mL susr injection 1 Vial by SubCUTAneous route once for 1 dose. 6/13/17 6/13/17 Yes Jessica Pumphrey V, PA   amoxicillin (AMOXIL) 500 mg capsule Take 1 Cap by mouth two (2) times a day for 10 days. 6/13/17 6/23/17 Yes Jessica Pumphrey V, PA   acetaminophen (TYLENOL) 650 mg CR tablet 1,300 mg. Yes Historical Provider   amLODIPine (NORVASC) 5 mg tablet Take 1 Tab by mouth daily. 12/6/16  Yes Suleman Montgomery MD   warfarin (COUMADIN) 2.5 mg tablet Take 1 Tab by mouth daily. 12/6/16  Yes Suleman Montgomery MD   pravastatin (PRAVACHOL) 40 mg tablet Take 1 Tab by mouth nightly. 12/6/16  Yes Suleman Montgomery MD   candesartan (ATACAND) 32 mg tablet Take 1 Tab by mouth daily. 12/6/16  Yes Suleman Montgomery MD   metoprolol tartrate (LOPRESSOR) 50 mg tablet Take 1 Tab by mouth two (2) times a day. Patient taking differently: Take 75 mg by mouth two (2) times a day.  12/6/16  Yes Suleman Montgomery MD        Allergies:  No Known Allergies    Review of Systems:  Items in Bold are positive  Constitutional: negative for fevers, chills and malaise  Eyes: negative for visual disturbance  Ears, Nose, Mouth, Throat, and Face: right sided sore throat, negative for nasal congestion  Neck: tender, swollen glands under right side of chin  Respiratory: negative for cough or SOB  Cardiovascular: negative for chest pain, chest pressure/discomfort  Gastrointestinal: negative for nausea, vomiting, melena, diarrhea, constipation and abdominal pain  Genitourinary:negative for frequency, dysuria or hematuria  Musculoskeletal:negative for myalgias and arthralgias  Neurological: negative for headaches, dizziness and paresthesia    Objective:     Visit Vitals    /70 (BP 1 Location: Left arm, BP Patient Position: Sitting)    Pulse 67    Temp 98.8 °F (37.1 °C) (Oral)    Resp 18    Ht 5' 2\" (1.575 m)    Wt 254 lb (115.2 kg)    SpO2 95%    BMI 46.46 kg/m2       Physical Exam:  GENERAL: alert, cooperative, no distress, appears stated age  [de-identified]: EYE: conjunctivae/corneas clear. PERRL, EOM's intact. EAR: TM's pearly gray bilaterally NOSE: Nasal mucosa pink and moist bilaterally, THROAT: right sided erythema, no exudate, no tonsillar hypertrophy  THYROID: no  thyromegaly  NECK:  right tender submandibular and submental adenopathy  LUNG: clear to auscultation bilaterally  HEART: irregularly irregular, 2/6 murmur, normal rate, S1, S2 normal, no  click, rub or gallop  ABDOMEN: soft, non-tender. Bowel sounds normal. No masses  EXTREMITIES:  extremities normal, atraumatic, no cyanosis or edema  NEUROLOGIC: AOx3. Gait normal.    Labs  POC Rapid Strep: negative    Lab Results   Component Value Date/Time    INR, External 2.40 05/20/2016    INR POC 2.3 06/13/2017 11:00 AM         Assessment and Plan:       ICD-10-CM ICD-9-CM    1. Routine general medical examination at a health care facility Z00.00 V70.0    2. Essential hypertension E64 693.7 METABOLIC PANEL, COMPREHENSIVE   3. Hypertriglyceridemia E78.1 272.1 LIPID PANEL   4. Chronic atrial fibrillation (HCC) I48.2 427.31 AMB POC PT/INR   5. Pharyngitis, unspecified etiology J02.9 462 amoxicillin (AMOXIL) 500 mg capsule      AMB POC RAPID STREP A   6. Fatigue, unspecified type R53.83 780.79 CBC WITH AUTOMATED DIFF      TSH 3RD GENERATION      T4, FREE   7. Postmenopausal Z78.0 V49.81 VITAMIN D, 25 HYDROXY   8. Obesity, unspecified obesity severity, unspecified obesity type E66.9 278.00 HEMOGLOBIN A1C WITH EAG   9. Colon cancer screening Z12.11 V76.51 REFERRAL FOR COLONOSCOPY   10.  Need for shingles vaccine Z23 V04.89 varicella zoster vacine live (ZOSTAVAX) 19,400 unit/0.65 mL susr injection         Medical Decision Making:  HTN- continue current therapy    HTG- labs- needs follow up    Afib- being managed by coumadin clinic and cardiology- patient advised to follow up with coumadin clinic regarding being on ABX    Pharyngitis- start amoxicillin- patient advised to follow up with coumadin clinic for repeat INR once done with ABX    Follow-up Disposition:  Return in about 3 months (around 9/13/2017) for routine care with Dr. Royanne Barthel. Patient acknowledges understanding of instructions and acknowledges understanding to call back if current symptoms worsen or new symptoms arise. Patient acknowledges and agrees with plan.         Signed By: JOSHUA Howard     June 13, 2017

## 2017-06-14 ENCOUNTER — TELEPHONE (OUTPATIENT)
Dept: FAMILY MEDICINE CLINIC | Facility: CLINIC | Age: 67
End: 2017-06-14

## 2017-06-14 NOTE — PROGRESS NOTES
Please advise labs show elevated triglycerides and bad cholesterol, recommend low fat diet and OTC fish oil  T4 still pending, will contact patient if abnormal

## 2017-06-14 NOTE — TELEPHONE ENCOUNTER
received a call from Mike Rashid from [facility] lab stating code for hemoglobin A1C is not covered, please change and re-ordered lab with corrected diagnosis code.

## 2017-06-15 ENCOUNTER — TELEPHONE (OUTPATIENT)
Dept: FAMILY MEDICINE CLINIC | Facility: CLINIC | Age: 67
End: 2017-06-15

## 2017-06-15 LAB — T4 FREE SERPL-MCNC: 1.2 NG/DL (ref 0.7–1.5)

## 2017-06-16 NOTE — TELEPHONE ENCOUNTER
Spoke with pt in regards to lab results. Two pt identifier's and permission to release verified. Relayed PAJasson's notes. Pt acknowledges understanding and voices no concerns at this time.

## 2017-09-13 ENCOUNTER — OFFICE VISIT (OUTPATIENT)
Dept: FAMILY MEDICINE CLINIC | Facility: CLINIC | Age: 67
End: 2017-09-13

## 2017-09-13 VITALS
WEIGHT: 251 LBS | SYSTOLIC BLOOD PRESSURE: 124 MMHG | OXYGEN SATURATION: 97 % | TEMPERATURE: 97.4 F | HEIGHT: 62 IN | HEART RATE: 82 BPM | RESPIRATION RATE: 12 BRPM | BODY MASS INDEX: 46.19 KG/M2 | DIASTOLIC BLOOD PRESSURE: 71 MMHG

## 2017-09-13 DIAGNOSIS — Z12.11 SCREEN FOR COLON CANCER: ICD-10-CM

## 2017-09-13 DIAGNOSIS — I10 ESSENTIAL HYPERTENSION: Primary | ICD-10-CM

## 2017-09-13 DIAGNOSIS — Z13.31 SCREENING FOR DEPRESSION: ICD-10-CM

## 2017-09-13 DIAGNOSIS — I48.20 CHRONIC ATRIAL FIBRILLATION (HCC): ICD-10-CM

## 2017-09-13 DIAGNOSIS — I35.0 AORTIC VALVE STENOSIS, UNSPECIFIED ETIOLOGY: ICD-10-CM

## 2017-09-13 NOTE — PROGRESS NOTES
Chief Complaint   Patient presents with    Hypertension    Cholesterol Problem    Irregular Heart Beat     Patient in room # 2. Patient is fasting. 1. Have you been to the ER, urgent care clinic since your last visit? Hospitalized since your last visit? No    2. Have you seen or consulted any other health care providers outside of the [address] [place] [address] since your last visit? Include any pap smears or colon screening. No     Reviewed.

## 2017-09-13 NOTE — PATIENT INSTRUCTIONS

## 2017-09-13 NOTE — MR AVS SNAPSHOT
Visit Information Date & Time Provider Department Dept. Phone Encounter #  
 9/13/2017  9:30 AM Maxwell Burns MD Donell Vásquez [phone] [id number] Follow-up Instructions Return in about 6 months (around 3/13/2018) for Follow up hypertension. Upcoming Health Maintenance Date Due  
 GLAUCOMA SCREENING Q2Y 10/5/2017 COLONOSCOPY 12/31/2017 MEDICARE YEARLY EXAM 6/14/2018 BREAST CANCER SCRN MAMMOGRAM 6/15/2019 DTaP/Tdap/Td series (2 - Td) 6/7/2026 Allergies as of 9/13/2017  Review Complete On: 9/13/2017 By: Maxwell Burns MD  
 No Known Allergies Current Immunizations  Reviewed on 6/20/2017 Name Date Influenza Vaccine 9/8/2017, 9/8/2016, 10/19/2015, 10/23/2014 Influenza Vaccine (Quad) PF 9/29/2015  9:07 AM  
 Pneumococcal Conjugate (PCV-13) 9/8/2016 Pneumococcal Polysaccharide (PPSV-23) 10/23/2014 Zoster Vaccine, Live 6/13/2017 Not reviewed this visit You Were Diagnosed With   
  
 Codes Comments Essential hypertension    -  Primary ICD-10-CM: I10 
ICD-9-CM: 401.9 Chronic atrial fibrillation (HCC)     ICD-10-CM: I51.3 ICD-9-CM: 427.31 Aortic valve stenosis, unspecified etiology     ICD-10-CM: I35.0 ICD-9-CM: 424.1 Screen for colon cancer     ICD-10-CM: Z12.11 ICD-9-CM: V76.51 Screening for depression     ICD-10-CM: Z13.89 ICD-9-CM: V79.0 Vitals BP Pulse Temp Resp Height(growth percentile) Weight(growth percentile) 124/71 (BP 1 Location: Right arm, BP Patient Position: Sitting) 82 97.4 °F (36.3 °C) (Oral) 12 5' 2\" (1.575 m) 251 lb (113.9 kg) SpO2 BMI OB Status Smoking Status 97% 45.91 kg/m2 Menopause Never Smoker Vitals History BMI and BSA Data Body Mass Index Body Surface Area 45.91 kg/m 2 2.23 m 2 Preferred Pharmacy Pharmacy Name Phone [address] [place] [address] [phone] Your Updated Medication List  
  
   
 This list is accurate as of: 9/13/17  9:50 AM.  Always use your most recent med list.  
  
  
  
  
 acetaminophen 650 mg CR tablet Commonly known as:  TYLENOL  
1,300 mg. amLODIPine 5 mg tablet Commonly known as:  [place] Cordial Take 1 Tab by mouth daily. candesartan 32 mg tablet Commonly known as:  ATACAND Take 1 Tab by mouth daily. FLUAD 0953-1821 (65 YR UP)(PF) Syrg injection Generic drug:  influenza vaccine 2017-18 (65 yrs+)(PF)  
inject 0.5 milliliter intramuscularly  
  
 metoprolol tartrate 50 mg tablet Commonly known as:  LOPRESSOR Take 1 Tab by mouth two (2) times a day. pravastatin 40 mg tablet Commonly known as:  PRAVACHOL Take 1 Tab by mouth nightly. warfarin 2.5 mg tablet Commonly known as:  COUMADIN Take 1 Tab by mouth daily. We Performed the Following [place] [address] [[id number] [facility]] REFERRAL TO GASTROENTEROLOGY [XJZ94 Custom] Follow-up Instructions Return in about 6 months (around 3/13/2018) for Follow up hypertension. Referral Information Referral ID Referred By Referred To  
  
 [id number] Luis Eduardo Degroot [address] [place] [address] [facility] [address] Jarrod Grossman Phone: [phone] Fax: [phone] Visits Status Start Date End Date 1 New Request 9/13/17 9/13/18 If your referral has a status of pending review or denied, additional information will be sent to support the outcome of this decision. Patient Instructions Medicare Wellness Visit, Female The best way to live healthy is to have a healthy lifestyle by eating a well-balanced diet, exercising regularly, limiting alcohol and stopping smoking. Regular physical exams and screening tests are another way to keep healthy. Preventive exams provided by your health care provider can find health problems before they become diseases or illnesses.  Preventive services including immunizations, screening tests, monitoring and exams can help you take care of your own health. All people over age 72 should have a pneumovax  and and a prevnar shot to prevent pneumonia. These are once in a lifetime unless you and your provider decide differently. All people over 65 should have a yearly flu shot and a tetanus vaccine every 10 years. A bone mass density to screen for osteoporosis or thinning of the bones should be done every 2 years after 65. Screening for diabetes mellitus with a blood sugar test should be done every year. Glaucoma is a disease of the eye due to increased ocular pressure that can lead to blindness and it should be done every year by an eye professional. 
 
Cardiovascular screening tests that check for elevated lipids (fatty part of blood) which can lead to heart disease and strokes should be done every 5 years. Colorectal screening that evaluates for blood or polyps in your colon should be done yearly as a stool test or every five years as a flexible sigmoidoscope or every 10 years as a colonoscopy up to age 76. Breast cancer screening with a mammogram is recommended biennially  for women age 54-69. Screening for cervical cancer with a pap smear and pelvic exam is recommended for women after age 72 years every 2 years up to age 79 or when the provider and patient decide to stop. If there is a history of cervical abnormalities or other increased risk for cancer then the test is recommended yearly. Hepatitis C screening is also recommended for anyone born between 80 through [address]. A shingles vaccine is also recommended once in a lifetime after age 61. Your Medicare Wellness Exam is recommended annually. Here is a list of your current Health Maintenance items with a due date: 
Health Maintenance Due Topic Date Due  Glaucoma Screening   10/05/2017  Colonoscopy  12/31/2017 Introducing [facility] SERVICES! Dear Erna Keane: Thank you for requesting a [employer] account. Our records indicate that you have previously registered for a [employer] account but its currently inactive. Please call our [employer] support line at [phone]. Additional Information If you have questions, please visit the Frequently Asked Questions section of the [employer] website at [email or web address] Remember, [employer] is NOT to be used for urgent needs. For medical emergencies, dial 911. Now available from your iPhone and Android! Please provide this summary of care documentation to your next provider. Your primary care clinician is listed as Wellington Samson. If you have any questions after today's visit, please call [phone].

## 2017-09-13 NOTE — PROGRESS NOTES
Internal Medicine Progress Note    Today's Date:  2017   Patient:  Juanita Escalante  Patient :  1950    Subjective:     Chief Complaint   Patient presents with    Hypertension    Cholesterol Problem    Irregular Heart Beat      Hypertension   This is a chronic problem. BP is at goal. Pt takes lopressor, norvasc and candesartan. Pt reports compliance with these medications. Hyperlipidemia  This is a chronic problem. TG is improved. Last FLP was checked on 2017. Pt takes pravastatin. Atrial fibrillation  This is a chronic problem. This is at goal. Pt is on coumadin and a beta blocker. Pt was taken off amiodarone. Pt is seeing Dr. Albert Sullivan and Dr. Maya Yuen. Pt is s/p cardiac ablation and cardioversion. Past Medical History:   Diagnosis Date    Advance directive discussed with patient 2016    Pt would like to appoint her son, Marques Peace as her medical decision maker in the event that she can no longer do so. Phone number is [phone]. Her secondary person is her niece, Nigel Zaidi. Phone number is [phone]. If her death is imminent and medical treatment will not help her recover, pt does not want life prolonging measures. If her condition makes her unaware of h    Dizziness 5/3/2016    Hypertriglyceridemia 2016    Obesity, Class III, BMI 40-49.9 (morbid obesity) ([facility] [address] 2015    Osteoarthritis     Shingles 2015     History reviewed. No pertinent surgical history. reports that she has never smoked. She has never used smokeless tobacco. She reports that she does not drink alcohol or use illicit drugs. Family History   Problem Relation Age of Onset    Heart Disease Father      unknown, ? MI at age 46    Arthritis-osteo Sister      No Known Allergies  Review of Systems   Positives in bold  CV:      chest pain, palpitations  PULM:  SOB, wheezing, cough, sputum production    Current Outpatient Meds and Allergies     Current Outpatient Prescriptions on File Prior to Visit   Medication Sig Dispense Refill    acetaminophen (TYLENOL) 650 mg CR tablet 1,300 mg.      amLODIPine (NORVASC) 5 mg tablet Take 1 Tab by mouth daily. 90 Tab 3    warfarin (COUMADIN) 2.5 mg tablet Take 1 Tab by mouth daily. 90 Tab 3    pravastatin (PRAVACHOL) 40 mg tablet Take 1 Tab by mouth nightly. 90 Tab 3    candesartan (ATACAND) 32 mg tablet Take 1 Tab by mouth daily. 90 Tab 3    metoprolol tartrate (LOPRESSOR) 50 mg tablet Take 1 Tab by mouth two (2) times a day. (Patient taking differently: Take 75 mg by mouth two (2) times a day.) 180 Tab 3     No current facility-administered medications on file prior to visit. No Known Allergies  Objective:     VS:    Visit Vitals    /71 (BP 1 Location: Right arm, BP Patient Position: Sitting)    Pulse 82    Temp 97.4 °F (36.3 °C) (Oral)    Resp 12    Ht 5' 2\" (1.575 m)    Wt 251 lb (113.9 kg)    SpO2 97%    BMI 45.91 kg/m2     General:   Well-nourished, well-groomed, pleasant, alert, in no acute distress  Head:  Normocephalic, atraumatic  Ears:  External ears WNL  Nose:  External nares WNL  Psych:  No pressured speech, no abnormal thought content    No visits with results within 3 Month(s) from this visit.   Latest known visit with results is:    Hospital Outpatient Visit on 06/13/2017   Component Date Value Ref Range Status    WBC 06/13/2017 8.0  4.6 - 13.2 K/uL Final    RBC 06/13/2017 4.70  4.20 - 5.30 M/uL Final    HGB 06/13/2017 14.6  12.0 - 16.0 g/dL Final    HCT 06/13/2017 44.4  35.0 - 45.0 % Final    MCV 06/13/2017 94.5  74.0 - 97.0 FL Final    MCH 06/13/2017 31.1  24.0 - 34.0 PG Final    MCHC 06/13/2017 32.9  31.0 - 37.0 g/dL Final    RDW 06/13/2017 13.8  11.6 - 14.5 % Final    PLATELET 36/77/3790 347  135 - 420 K/uL Final    MPV 06/13/2017 10.3  9.2 - 11.8 FL Final    NEUTROPHILS 06/13/2017 72  40 - 73 % Final    LYMPHOCYTES 06/13/2017 18* 21 - 52 % Final    MONOCYTES 06/13/2017 9  3 - 10 % Final    EOSINOPHILS 06/13/2017 1  0 - 5 % Final    BASOPHILS 06/13/2017 0  0 - 2 % Final    ABS. NEUTROPHILS 06/13/2017 5.8  1.8 - 8.0 K/UL Final    ABS. LYMPHOCYTES 06/13/2017 1.5  0.9 - 3.6 K/UL Final    ABS. MONOCYTES 06/13/2017 0.7  0.05 - 1.2 K/UL Final    ABS. EOSINOPHILS 06/13/2017 0.1  0.0 - 0.4 K/UL Final    ABS. BASOPHILS 06/13/2017 0.0  0.0 - 0.06 K/UL Final    DF 06/13/2017 AUTOMATED    Final    Sodium 06/13/2017 142  136 - 145 mmol/L Final    Potassium 06/13/2017 4.0  3.5 - 5.5 mmol/L Final    Chloride 06/13/2017 107  100 - 108 mmol/L Final    CO2 06/13/2017 27  21 - 32 mmol/L Final    Anion gap 06/13/2017 8  3.0 - 18 mmol/L Final    Glucose 06/13/2017 96  74 - 99 mg/dL Final    BUN 06/13/2017 11  7.0 - 18 MG/DL Final    Creatinine 06/13/2017 0.70  0.6 - 1.3 MG/DL Final    BUN/Creatinine ratio 06/13/2017 16  12 - 20   Final    GFR est AA 06/13/2017 >60  >60 ml/min/1.73m2 Final    GFR est non-AA 06/13/2017 >60  >60 ml/min/1.73m2 Final    Comment: (NOTE)  Estimated GFR is calculated using the Modification of Diet in Renal   Disease (MDRD) Study equation, reported for both  Americans   (GFRAA) and non- Americans (GFRNA), and normalized to 1.73m2   body surface area. The physician must decide which value applies to   the patient. The MDRD study equation should only be used in   individuals age 25 or older. It has not been validated for the   following: pregnant women, patients with serious comorbid conditions,   or on certain medications, or persons with extremes of body size,   muscle mass, or nutritional status.  Calcium 06/13/2017 8.6  8.5 - 10.1 MG/DL Final    Bilirubin, total 06/13/2017 0.6  0.2 - 1.0 MG/DL Final    ALT (SGPT) 06/13/2017 25  13 - 56 U/L Final    AST (SGOT) 06/13/2017 20  15 - 37 U/L Final    Alk.  phosphatase 06/13/2017 78  45 - 117 U/L Final    Protein, total 06/13/2017 6.8  6.4 - 8.2 g/dL Final    Albumin 06/13/2017 3.9  3.4 - 5.0 g/dL Final    Globulin 06/13/2017 2.9  2.0 - 4.0 g/dL Final    A-G Ratio 06/13/2017 1.3  0.8 - 1.7   Final    Hemoglobin A1c 06/13/2017 5.3  4.2 - 5.6 % Final    Comment: (NOTE)  HbA1C Interpretive Ranges  <5.7              Normal  5.7 - 6.4         Consider Prediabetes  >6.5              Consider Diabetes      Est. average glucose 06/13/2017 105  mg/dL Final    Comment: (NOTE)  The eAG should be interpreted with patient characteristics in mind   since ethnicity, interindividual differences, red cell lifespan,   variation in rates of glycation, etc. may affect the validity of the   calculation.  LIPID PROFILE 06/13/2017        Final    Cholesterol, total 06/13/2017 180  <200 MG/DL Final    Triglyceride 06/13/2017 155* <150 MG/DL Final    Comment: The drugs N-acetylcysteine (NAC) and  Metamiszole have been found to cause falsely  low results in this chemical assay. Please  be sure to submit blood samples obtained  BEFORE administration of either of these  drugs to assure correct results.  HDL Cholesterol 06/13/2017 44  40 - 60 MG/DL Final    LDL, calculated 06/13/2017 105* 0 - 100 MG/DL Final    VLDL, calculated 06/13/2017 31  MG/DL Final    CHOL/HDL Ratio 06/13/2017 4.1  0 - 5.0   Final    Vitamin D 25-Hydroxy 06/13/2017 44.2  30 - 100 ng/mL Final    Comment: (NOTE)  Deficiency               <20 ng/mL  Insufficiency          20-30 ng/mL  Sufficient             ng/mL  Possible toxicity       >100 ng/mL    The Method used is Siemens Advia Centaur currently standardized to a   Center of Disease Control and Prevention (CDC) certified reference   [facility]. Samples containing fluorescein dye can produce falsely   elevated values when tested with the ADVIA Centaur Vitamin D Assay. It is recommended that results in the toxic range, >100 ng/mL, be   retested 72 hours post fluorescein exposure.       TSH 06/13/2017 0.98  0.36 - 3.74 uIU/mL Final    T4, Free 06/13/2017 1.2  0.7 - 1.5 NG/DL Final Assessment/Plan & Orders:         ICD-10-CM ICD-9-CM    1. Essential hypertension I10 401.9    2. Chronic atrial fibrillation (HCC) I48.2 427.31    3. Aortic valve stenosis, unspecified etiology I35.0 424.1    4. Screen for colon cancer Z12.11 V76.51 REFERRAL TO GASTROENTEROLOGY   5. Screening for depression Z13.89 V79.0 [place] [address]      Healthy lifestyle has been encouraged including avoidance of tobacco, limiting or avoiding alcohol intake, heart healthy diet which is low in cholesterol and saturated fat and contains fresh fruits, vegetables and whole grains and fiber, regular exercise with goals of 20-30 minutes 3-5 days weekly and maintaining an optimal BMI. Pt asked to schedule an eye exam. Form given  Pt will schedule a colonoscopy (not needed until December). Pt would [facility] to get done at [facility]  Follow up with cardiology    Follow-up Disposition:  Return in about 6 months (around 3/13/2018) for Follow up hypertension. *Patient verbalized understanding and agreement with the plan. Patient was given an after-visit summary. Luciana Pires1 MELISSA Oliveira MD - Internal Medicine  9/13/2017, 9:38 AM  Donell Martinez [address]  [address] [place], [address]  Phone [phone]  Fax [phone]

## 2017-12-21 DIAGNOSIS — E78.5 HYPERLIPIDEMIA, UNSPECIFIED HYPERLIPIDEMIA TYPE: ICD-10-CM

## 2017-12-21 RX ORDER — PRAVASTATIN SODIUM 40 MG/1
40 TABLET ORAL
Qty: 90 TAB | Refills: 3 | Status: SHIPPED | OUTPATIENT
Start: 2017-12-21 | End: 2018-03-15 | Stop reason: SDUPTHER

## 2018-03-15 ENCOUNTER — OFFICE VISIT (OUTPATIENT)
Dept: FAMILY MEDICINE CLINIC | Facility: CLINIC | Age: 68
End: 2018-03-15

## 2018-03-15 VITALS
BODY MASS INDEX: 47.11 KG/M2 | TEMPERATURE: 97.7 F | HEART RATE: 62 BPM | WEIGHT: 256 LBS | HEIGHT: 62 IN | DIASTOLIC BLOOD PRESSURE: 80 MMHG | SYSTOLIC BLOOD PRESSURE: 134 MMHG | OXYGEN SATURATION: 95 % | RESPIRATION RATE: 15 BRPM

## 2018-03-15 DIAGNOSIS — E66.01 OBESITY, CLASS III, BMI 40-49.9 (MORBID OBESITY) (HCC): ICD-10-CM

## 2018-03-15 DIAGNOSIS — R73.9 ELEVATED BLOOD SUGAR: ICD-10-CM

## 2018-03-15 DIAGNOSIS — E78.5 HYPERLIPIDEMIA, UNSPECIFIED HYPERLIPIDEMIA TYPE: ICD-10-CM

## 2018-03-15 DIAGNOSIS — Z13.5 GLAUCOMA SCREENING: ICD-10-CM

## 2018-03-15 DIAGNOSIS — I48.20 CHRONIC ATRIAL FIBRILLATION (HCC): ICD-10-CM

## 2018-03-15 DIAGNOSIS — I10 ESSENTIAL HYPERTENSION: Primary | ICD-10-CM

## 2018-03-15 RX ORDER — CANDESARTAN 32 MG/1
32 TABLET ORAL DAILY
Qty: 90 TAB | Refills: 3 | Status: SHIPPED | OUTPATIENT
Start: 2018-03-15 | End: 2019-02-28 | Stop reason: SDUPTHER

## 2018-03-15 RX ORDER — AMLODIPINE BESYLATE 5 MG/1
5 TABLET ORAL DAILY
Qty: 90 TAB | Refills: 3 | Status: SHIPPED | OUTPATIENT
Start: 2018-03-15 | End: 2018-03-15 | Stop reason: SDUPTHER

## 2018-03-15 RX ORDER — WARFARIN 2.5 MG/1
2.5 TABLET ORAL DAILY
Qty: 90 TAB | Refills: 3 | Status: SHIPPED | OUTPATIENT
Start: 2018-03-15 | End: 2018-03-15 | Stop reason: SDUPTHER

## 2018-03-15 RX ORDER — METOPROLOL TARTRATE 25 MG/1
75 TABLET, FILM COATED ORAL 2 TIMES DAILY
Qty: 540 TAB | Refills: 3 | Status: SHIPPED | OUTPATIENT
Start: 2018-03-15 | End: 2018-03-15 | Stop reason: SDUPTHER

## 2018-03-15 RX ORDER — PRAVASTATIN SODIUM 40 MG/1
40 TABLET ORAL
Qty: 90 TAB | Refills: 3 | Status: SHIPPED | OUTPATIENT
Start: 2018-03-15 | End: 2019-02-28 | Stop reason: SDUPTHER

## 2018-03-15 RX ORDER — DEXTROMETHORPHAN HYDROBROMIDE, GUAIFENESIN 5; 100 MG/5ML; MG/5ML
1300 LIQUID ORAL EVERY 8 HOURS
Qty: 90 TAB | Refills: 3 | Status: SHIPPED | OUTPATIENT
Start: 2018-03-15 | End: 2019-02-28 | Stop reason: SDUPTHER

## 2018-03-15 RX ORDER — DEXTROMETHORPHAN HYDROBROMIDE, GUAIFENESIN 5; 100 MG/5ML; MG/5ML
1300 LIQUID ORAL EVERY 8 HOURS
Qty: 90 TAB | Refills: 3 | Status: SHIPPED | OUTPATIENT
Start: 2018-03-15 | End: 2018-03-15 | Stop reason: SDUPTHER

## 2018-03-15 RX ORDER — CANDESARTAN 32 MG/1
32 TABLET ORAL DAILY
Qty: 90 TAB | Refills: 3 | Status: SHIPPED | OUTPATIENT
Start: 2018-03-15 | End: 2018-03-15 | Stop reason: SDUPTHER

## 2018-03-15 RX ORDER — PRAVASTATIN SODIUM 40 MG/1
40 TABLET ORAL
Qty: 90 TAB | Refills: 3 | Status: SHIPPED | OUTPATIENT
Start: 2018-03-15 | End: 2018-03-15 | Stop reason: SDUPTHER

## 2018-03-15 RX ORDER — METOPROLOL TARTRATE 25 MG/1
75 TABLET, FILM COATED ORAL 2 TIMES DAILY
Qty: 540 TAB | Refills: 3 | Status: SHIPPED | OUTPATIENT
Start: 2018-03-15 | End: 2019-02-28 | Stop reason: SDUPTHER

## 2018-03-15 RX ORDER — WARFARIN 2.5 MG/1
2.5 TABLET ORAL DAILY
Qty: 90 TAB | Refills: 3 | Status: SHIPPED | OUTPATIENT
Start: 2018-03-15 | End: 2019-02-28 | Stop reason: SDUPTHER

## 2018-03-15 RX ORDER — AMLODIPINE BESYLATE 5 MG/1
5 TABLET ORAL DAILY
Qty: 90 TAB | Refills: 3 | Status: SHIPPED | OUTPATIENT
Start: 2018-03-15 | End: 2019-02-28 | Stop reason: SDUPTHER

## 2018-03-15 NOTE — PROGRESS NOTES
Marychuy Huynh is a 70 y.o.  female presents today for office visit for follow up for hypertension. Pt is fasting. Pt is in Room # 2.      1. Have you been to the ER, urgent care clinic since your last visit? Hospitalized since your last visit? No    2. Have you seen or consulted any other health care providers outside of the [address] [place] [address] since your last visit? Include any pap smears or colon screening. Yes Cardiology 3/2018 and Cardiac Sonogram 3/2018, Coumadin Clinic every 6 weeks. Health Maintenance reviewed - patient asked to schedule her colonoscopy and glaucoma screening.       Upcoming Appts  Cardiology 3/20/18  Coumadin Clinic - 3/2018    Requested Prescriptions      No prescriptions requested or ordered in this encounter     Visit Vitals    /80 (BP 1 Location: Left arm, BP Patient Position: Sitting)    Pulse 62    Temp 97.7 °F (36.5 °C) (Oral)    Resp 15    Ht 5' 2\" (1.575 m)    Wt 256 lb (116.1 kg)    SpO2 95%    BMI 46.82 kg/m2

## 2018-03-15 NOTE — MR AVS SNAPSHOT
[address] [place] [address] [phone] 
[phone] Patient: Tara Herrera MRN: [id number] KPR:[birth year]47 Visit Information Date & Time Provider Department Dept. Phone Encounter #  
 3/15/2018  9:30 AM Van Smith MD [facility] [phone] [id number] Follow-up Instructions Return in about 3 months (around 6/15/2018) for Medicare wellness physical, Go over lab/imaging results, 30 minutes, Follow up hyperlipidemia, Follo. Upcoming Health Maintenance Date Due  
 GLAUCOMA SCREENING Q2Y 10/5/2017 COLONOSCOPY 12/31/2017 MEDICARE YEARLY EXAM 6/14/2018 BREAST CANCER SCRN MAMMOGRAM 6/15/2019 DTaP/Tdap/Td series (2 - Td) 6/7/2026 Allergies as of 3/15/2018  Review Complete On: 3/15/2018 By: Van Smith MD  
 No Known Allergies Current Immunizations  Reviewed on 6/20/2017 Name Date Influenza Vaccine 9/8/2017, 9/8/2016, 10/19/2015, 10/23/2014 Influenza Vaccine (Quad) PF 9/29/2015  9:07 AM  
 Pneumococcal Conjugate (PCV-13) 9/8/2016 Pneumococcal Polysaccharide (PPSV-23) 10/23/2014 Zoster Vaccine, Live 6/13/2017 Not reviewed this visit You Were Diagnosed With   
  
 Codes Comments Essential hypertension    -  Primary ICD-10-CM: I10 
ICD-9-CM: 401.9 Hyperlipidemia, unspecified hyperlipidemia type     ICD-10-CM: E78.5 ICD-9-CM: 272.4 Chronic atrial fibrillation (HCC)     ICD-10-CM: Z84.5 ICD-9-CM: 427.31 Glaucoma screening     ICD-10-CM: Z13.5 ICD-9-CM: V80.1 Elevated blood sugar     ICD-10-CM: R73.9 ICD-9-CM: 790.29 Vitals BP Pulse Temp Resp Height(growth percentile) Weight(growth percentile) 134/80 (BP 1 Location: Left arm, BP Patient Position: Sitting) 62 97.7 °F (36.5 °C) (Oral) 15 5' 2\" (1.575 m) 256 lb (116.1 kg) SpO2 BMI OB Status Smoking Status 95% 46.82 kg/m2 Menopause Never Smoker BMI and BSA Data Body Mass Index Body Surface Area  
 46.82 kg/m 2 2.25 m 2 Preferred Pharmacy Pharmacy Name Phone [phone] Omari Damon [address], [address] [facility] [address] [place] [address] [phone] Your Updated Medication List  
  
   
This list is accurate as of 3/15/18 10:00 AM.  Always use your most recent med list.  
  
  
  
  
 acetaminophen 650 mg Tber Commonly known as:  TYLENOL Take 2 Tabs by mouth every eight (8) hours. amLODIPine 5 mg tablet Commonly known as:  Clayton Pique Take 1 Tab by mouth daily. candesartan 32 mg tablet Commonly known as:  ATACAND Take 1 Tab by mouth daily. FLUAD 6850-6419 (65 YR UP)(PF) Syrg injection Generic drug:  influenza vaccine 2017-18 (65 yrs+)(PF)  
inject 0.5 milliliter intramuscularly  
  
 metoprolol tartrate 25 mg tablet Commonly known as:  LOPRESSOR Take 3 Tabs by mouth two (2) times a day. pravastatin 40 mg tablet Commonly known as:  PRAVACHOL Take 1 Tab by mouth nightly. warfarin 2.5 mg tablet Commonly known as:  COUMADIN Take 1 Tab by mouth daily. Prescriptions Printed Refills  
 pravastatin (PRAVACHOL) 40 mg tablet 3 Sig: Take 1 Tab by mouth nightly. Class: Print Route: Oral  
 acetaminophen (TYLENOL) 650 mg TbER 3 Sig: Take 2 Tabs by mouth every eight (8) hours. Class: Print Route: Oral  
 amLODIPine (NORVASC) 5 mg tablet 3 Sig: Take 1 Tab by mouth daily. Class: Print Route: Oral  
 warfarin (COUMADIN) 2.5 mg tablet 3 Sig: Take 1 Tab by mouth daily. Class: Print Route: Oral  
 candesartan (ATACAND) 32 mg tablet 3 Sig: Take 1 Tab by mouth daily. Class: Print Route: Oral  
 metoprolol tartrate (LOPRESSOR) 25 mg tablet 3 Sig: Take 3 Tabs by mouth two (2) times a day. Class: Print Route: Oral  
  
We Performed the Following REFERRAL TO OPHTHALMOLOGY [REF57 Custom] Comments:  
 Please evaluate for glaucoma screening. Follow-up Instructions Return in about 3 months (around 6/15/2018) for Medicare wellness physical, Go over lab/imaging results, 30 minutes, Follow up hyperlipidemia, Follo. To-Do List   
 03/15/2018 Lab:  HEMOGLOBIN A1C WITH EAG   
  
 03/15/2018 Lab:  T4, FREE   
  
 03/15/2018 Lab:  TSH 3RD GENERATION   
  
 03/18/2018 Lab:  CBC WITH AUTOMATED DIFF   
  
 03/18/2018 Lab:  LIPID PANEL   
  
 03/18/2018 Lab:  METABOLIC PANEL, COMPREHENSIVE Referral Information Referral ID Referred By Referred To  
  
 [id number] Jerrell Acuna Not Available Visits Status Start Date End Date 1 New Request 3/15/18 3/15/19 If your referral has a status of pending review or denied, additional information will be sent to support the outcome of this decision. Introducing [facility]! Dear Hailee Lentz: Thank you for requesting a [employer] account. Our records indicate that you have previously registered for a [employer] account but its currently inactive. Please call our [employer] support line at [phone]. Additional Information If you have questions, please visit the Frequently Asked Questions section of the [employer] website at [email or web address] Remember, [employer] is NOT to be used for urgent needs. For medical emergencies, dial 911. Now available from your iPhone and Android! Please provide this summary of care documentation to your next provider. Your primary care clinician is listed as Willie Juarez. If you have any questions after today's visit, please call [phone].

## 2018-03-15 NOTE — PROGRESS NOTES
Internal Medicine Progress Note    Today's Date:  2018   Patient:  Juan Pablo York  Patient :  1950    Subjective:     Chief Complaint   Patient presents with    Hypertension    Cholesterol Problem    Weight Management      Hypertension   This is a chronic problem. BP is at goal. Pt takes lopressor, norvasc and candesartan. Pt reports compliance with these medications. Hyperlipidemia  This is a chronic problem. LDL is not at goal. Last FLP was checked on 2017. Pt takes pravastatin. Atrial fibrillation  This is a chronic problem. This is at goal. Pt is on coumadin and a beta blocker. Pt was taken off amiodarone. Pt is seeing Dr. Ashleigh Bhatt and Dr. Kelvin Dewitt. Pt is s/p cardiac ablation and cardioversion. Past Medical History:   Diagnosis Date    Advance directive discussed with patient 2016    Pt would like to appoint her son, Gibran Romero as her medical decision maker in the event that she can no longer do so. Phone number is [phone]. Her secondary person is her niece, Mini Chand. Phone number is [phone]. If her death is imminent and medical treatment will not help her recover, pt does not want life prolonging measures. If her condition makes her unaware of h    Dizziness 5/3/2016    Hypertriglyceridemia 2016    Obesity, Class III, BMI 40-49.9 (morbid obesity) ([facility] 2015    Osteoarthritis     Shingles 2015     History reviewed. No pertinent surgical history. reports that she has never smoked. She has never used smokeless tobacco. She reports that she does not drink alcohol or use illicit drugs. Family History   Problem Relation Age of Onset    Heart Disease Father      unknown, ? MI at age 46    Arthritis-osteo Sister      No Known Allergies  Review of Systems   Positives in bold  CV:      chest pain, palpitations  PULM:  SOB, wheezing, cough, sputum production    Current Outpatient Meds and Allergies     Current Outpatient Prescriptions on File Prior to Visit   Medication Sig Dispense Refill    FLUAD [id number], 65 YR UP,,PF, syrg injection inject 0.5 milliliter intramuscularly  0     No current facility-administered medications on file prior to visit. No Known Allergies  Objective:     VS:    Visit Vitals    /80 (BP 1 Location: Left arm, BP Patient Position: Sitting)    Pulse 62    Temp 97.7 °F (36.5 °C) (Oral)    Resp 15    Ht 5' 2\" (1.575 m)    Wt 256 lb (116.1 kg)    SpO2 95%    BMI 46.82 kg/m2     General:   Well-nourished, well-groomed, pleasant, alert, in no acute distress  Head:  Normocephalic, atraumatic  Ears:  External ears WNL  Nose:  External nares WNL  Psych:  No pressured speech, no abnormal thought content    PHQ over the last two weeks 9/13/2017   PHQ Not Done -   Little interest or pleasure in doing things Not at all   Feeling down, depressed or hopeless Not at all   Total Score PHQ 2 0     No visits with results within 3 Month(s) from this visit. Latest known visit with results is:    Hospital Outpatient Visit on 06/13/2017   Component Date Value Ref Range Status    WBC 06/13/2017 8.0  4.6 - 13.2 K/uL Final    RBC 06/13/2017 4.70  4.20 - 5.30 M/uL Final    HGB 06/13/2017 14.6  12.0 - 16.0 g/dL Final    HCT 06/13/2017 44.4  35.0 - 45.0 % Final    MCV 06/13/2017 94.5  74.0 - 97.0 FL Final    MCH 06/13/2017 31.1  24.0 - 34.0 PG Final    MCHC 06/13/2017 32.9  31.0 - 37.0 g/dL Final    RDW 06/13/2017 13.8  11.6 - 14.5 % Final    PLATELET 63/41/4908 317  135 - 420 K/uL Final    MPV 06/13/2017 10.3  9.2 - 11.8 FL Final    NEUTROPHILS 06/13/2017 72  40 - 73 % Final    LYMPHOCYTES 06/13/2017 18* 21 - 52 % Final    MONOCYTES 06/13/2017 9  3 - 10 % Final    EOSINOPHILS 06/13/2017 1  0 - 5 % Final    BASOPHILS 06/13/2017 0  0 - 2 % Final    ABS. NEUTROPHILS 06/13/2017 5.8  1.8 - 8.0 K/UL Final    ABS. LYMPHOCYTES 06/13/2017 1.5  0.9 - 3.6 K/UL Final    ABS.  MONOCYTES 06/13/2017 0.7  0.05 - 1.2 K/UL Final    ABS. EOSINOPHILS 06/13/2017 0.1  0.0 - 0.4 K/UL Final    ABS. BASOPHILS 06/13/2017 0.0  0.0 - 0.06 K/UL Final    DF 06/13/2017 AUTOMATED    Final    Sodium 06/13/2017 142  136 - 145 mmol/L Final    Potassium 06/13/2017 4.0  3.5 - 5.5 mmol/L Final    Chloride 06/13/2017 107  100 - 108 mmol/L Final    CO2 06/13/2017 27  21 - 32 mmol/L Final    Anion gap 06/13/2017 8  3.0 - 18 mmol/L Final    Glucose 06/13/2017 96  74 - 99 mg/dL Final    BUN 06/13/2017 11  7.0 - 18 MG/DL Final    Creatinine 06/13/2017 0.70  0.6 - 1.3 MG/DL Final    BUN/Creatinine ratio 06/13/2017 16  12 - 20   Final    GFR est AA 06/13/2017 >60  >60 ml/min/1.73m2 Final    GFR est non-AA 06/13/2017 >60  >60 ml/min/1.73m2 Final    Comment: (NOTE)  Estimated GFR is calculated using the Modification of Diet in Renal   Disease (MDRD) Study equation, reported for both  Americans   (GFRAA) and non- Americans (GFRNA), and normalized to 1.73m2   body surface area. The physician must decide which value applies to   the patient. The MDRD study equation should only be used in   individuals age 25 or older. It has not been validated for the   following: pregnant women, patients with serious comorbid conditions,   or on certain medications, or persons with extremes of body size,   muscle mass, or nutritional status.  Calcium 06/13/2017 8.6  8.5 - 10.1 MG/DL Final    Bilirubin, total 06/13/2017 0.6  0.2 - 1.0 MG/DL Final    ALT (SGPT) 06/13/2017 25  13 - 56 U/L Final    AST (SGOT) 06/13/2017 20  15 - 37 U/L Final    Alk.  phosphatase 06/13/2017 78  45 - 117 U/L Final    Protein, total 06/13/2017 6.8  6.4 - 8.2 g/dL Final    Albumin 06/13/2017 3.9  3.4 - 5.0 g/dL Final    Globulin 06/13/2017 2.9  2.0 - 4.0 g/dL Final    A-G Ratio 06/13/2017 1.3  0.8 - 1.7   Final    Hemoglobin A1c 06/13/2017 5.3  4.2 - 5.6 % Final    Comment: (NOTE)  HbA1C Interpretive Ranges  <5.7              Normal  5.7 - 6.4 Consider Prediabetes  >6.5              Consider Diabetes      Est. average glucose 06/13/2017 105  mg/dL Final    Comment: (NOTE)  The eAG should be interpreted with patient characteristics in mind   since ethnicity, interindividual differences, red cell lifespan,   variation in rates of glycation, etc. may affect the validity of the   calculation.  LIPID PROFILE 06/13/2017        Final    Cholesterol, total 06/13/2017 180  <200 MG/DL Final    Triglyceride 06/13/2017 155* <150 MG/DL Final    Comment: The drugs N-acetylcysteine (NAC) and  Metamiszole have been found to cause falsely  low results in this chemical assay. Please  be sure to submit blood samples obtained  BEFORE administration of either of these  drugs to assure correct results.  HDL Cholesterol 06/13/2017 44  40 - 60 MG/DL Final    LDL, calculated 06/13/2017 105* 0 - 100 MG/DL Final    VLDL, calculated 06/13/2017 31  MG/DL Final    CHOL/HDL Ratio 06/13/2017 4.1  0 - 5.0   Final    Vitamin D 25-Hydroxy 06/13/2017 44.2  30 - 100 ng/mL Final    Comment: (NOTE)  Deficiency               <20 ng/mL  Insufficiency          20-30 ng/mL  Sufficient             ng/mL  Possible toxicity       >100 ng/mL    The Method used is Siemens Advia Centaur currently standardized to a   Center of Disease Control and Prevention (CDC) certified reference   [address]. Samples containing fluorescein dye can produce falsely   elevated values when tested with the ADVIA Centaur Vitamin D Assay. It is recommended that results in the toxic range, >100 ng/mL, be   retested 72 hours post fluorescein exposure.  TSH 06/13/2017 0.98  0.36 - 3.74 uIU/mL Final    T4, Free 06/13/2017 1.2  0.7 - 1.5 NG/DL Final     Assessment/Plan & Orders:         ICD-10-CM ICD-9-CM    1. Essential hypertension I10 401.9 amLODIPine (NORVASC) 5 mg tablet      candesartan (ATACAND) 32 mg tablet      CBC WITH AUTOMATED DIFF   2.  Hyperlipidemia, unspecified hyperlipidemia type E78.5 272.4 pravastatin (PRAVACHOL) 40 mg tablet      LIPID PANEL      METABOLIC PANEL, COMPREHENSIVE      T4, FREE      TSH 3RD GENERATION   3. Chronic atrial fibrillation (HCC) I48.2 427.31 warfarin (COUMADIN) 2.5 mg tablet      metoprolol tartrate (LOPRESSOR) 25 mg tablet   4. Glaucoma screening Z13.5 V80.1 REFERRAL TO OPHTHALMOLOGY   5. Elevated blood sugar R73.9 790.29 HEMOGLOBIN A1C WITH EAG   6. Obesity, Class III, BMI 40-49.9 (morbid obesity) (HCC) E66.01 278.01       Healthy lifestyle has been encouraged including avoidance of tobacco, limiting or avoiding alcohol intake, heart healthy diet which is low in cholesterol and saturated fat and contains fresh fruits, vegetables and whole grains and fiber, regular exercise with goals of 20-30 minutes 3-5 days weekly and maintaining an optimal BMI. Pt asked to schedule an eye exam. Form given  Pt will schedule a colonoscopy (not needed until December). Pt would like to get done at [facility]  Follow up with cardiology  Depression screenin17    Follow-up Disposition:  Return in about 3 months (around 6/15/2018) for Medicare wellness physical, Go over lab/imaging results, 30 minutes, Follow up hyperlipidemia, Follo. *Patient verbalized understanding and agreement with the plan. Patient was given an after-visit summary. Suresh Hatfield MD - Internal Medicine  2018, 9:38 AM  [facility]  [address] [place], [address]  Phone [phone]  Fax [phone]

## 2018-04-02 NOTE — ASSESSMENT & PLAN NOTE
Uncontrolled, based on history, physical exam and review of pertinent labs, studies and medications; meds reconciled; lifestyle modifications recommended. Key Obesity Meds     Patient is on no anti-obesity meds.         Lab Results   Component Value Date/Time    Hemoglobin A1c 5.3 06/13/2017 09:42 AM    Glucose 96 06/13/2017 09:42 AM    Cholesterol, total 180 06/13/2017 09:42 AM    HDL Cholesterol 44 06/13/2017 09:42 AM    LDL, calculated 105 06/13/2017 09:42 AM    Triglyceride 155 06/13/2017 09:42 AM    TSH 0.98 06/13/2017 09:42 AM    Sodium 142 06/13/2017 09:42 AM    Potassium 4.0 06/13/2017 09:42 AM    ALT (SGPT) 25 06/13/2017 09:42 AM    AST (SGOT) 20 06/13/2017 09:42 AM    Vitamin D 25-Hydroxy 44.2 06/13/2017 09:42 AM

## 2018-06-11 ENCOUNTER — HOSPITAL ENCOUNTER (OUTPATIENT)
Dept: LAB | Age: 68
Discharge: HOME | End: 2018-06-11
Payer: MEDICARE

## 2018-06-11 DIAGNOSIS — E78.5 HYPERLIPIDEMIA, UNSPECIFIED HYPERLIPIDEMIA TYPE: ICD-10-CM

## 2018-06-11 DIAGNOSIS — R73.9 ELEVATED BLOOD SUGAR: ICD-10-CM

## 2018-06-11 DIAGNOSIS — I10 ESSENTIAL HYPERTENSION: ICD-10-CM

## 2018-06-11 LAB
ALBUMIN SERPL-MCNC: 3.9 G/DL (ref 3.4–5)
ALBUMIN/GLOB SERPL: 1.4 {RATIO} (ref 0.8–1.7)
ALP SERPL-CCNC: 68 U/L (ref 45–117)
ALT SERPL-CCNC: 26 U/L (ref 13–56)
ANION GAP SERPL CALC-SCNC: 7 MMOL/L (ref 3–18)
AST SERPL-CCNC: 24 U/L (ref 15–37)
BASOPHILS # BLD: 0 K/UL (ref 0–0.06)
BASOPHILS NFR BLD: 1 % (ref 0–2)
BILIRUB SERPL-MCNC: 0.8 MG/DL (ref 0.2–1)
BUN SERPL-MCNC: 19 MG/DL (ref 7–18)
BUN/CREAT SERPL: 26 (ref 12–20)
CALCIUM SERPL-MCNC: 8.2 MG/DL (ref 8.5–10.1)
CHLORIDE SERPL-SCNC: 107 MMOL/L (ref 100–108)
CHOLEST SERPL-MCNC: 181 MG/DL
CO2 SERPL-SCNC: 28 MMOL/L (ref 21–32)
CREAT SERPL-MCNC: 0.74 MG/DL (ref 0.6–1.3)
DIFFERENTIAL METHOD BLD: NORMAL
EOSINOPHIL # BLD: 0.1 K/UL (ref 0–0.4)
EOSINOPHIL NFR BLD: 1 % (ref 0–5)
ERYTHROCYTE [DISTWIDTH] IN BLOOD BY AUTOMATED COUNT: 13.7 % (ref 11.6–14.5)
GLOBULIN SER CALC-MCNC: 2.7 G/DL (ref 2–4)
GLUCOSE SERPL-MCNC: 91 MG/DL (ref 74–99)
HCT VFR BLD AUTO: 43 % (ref 35–45)
HDLC SERPL-MCNC: 42 MG/DL (ref 40–60)
HDLC SERPL: 4.3 {RATIO} (ref 0–5)
HGB BLD-MCNC: 14 G/DL (ref 12–16)
LDLC SERPL CALC-MCNC: 98 MG/DL (ref 0–100)
LIPID PROFILE,FLP: ABNORMAL
LYMPHOCYTES # BLD: 2 K/UL (ref 0.9–3.6)
LYMPHOCYTES NFR BLD: 35 % (ref 21–52)
MCH RBC QN AUTO: 30.4 PG (ref 24–34)
MCHC RBC AUTO-ENTMCNC: 32.6 G/DL (ref 31–37)
MCV RBC AUTO: 93.5 FL (ref 74–97)
MONOCYTES # BLD: 0.6 K/UL (ref 0.05–1.2)
MONOCYTES NFR BLD: 10 % (ref 3–10)
NEUTS SEG # BLD: 3 K/UL (ref 1.8–8)
NEUTS SEG NFR BLD: 53 % (ref 40–73)
PLATELET # BLD AUTO: 302 K/UL (ref 135–420)
PMV BLD AUTO: 9.8 FL (ref 9.2–11.8)
POTASSIUM SERPL-SCNC: 4.2 MMOL/L (ref 3.5–5.5)
PROT SERPL-MCNC: 6.6 G/DL (ref 6.4–8.2)
RBC # BLD AUTO: 4.6 M/UL (ref 4.2–5.3)
SODIUM SERPL-SCNC: 142 MMOL/L (ref 136–145)
T4 FREE SERPL-MCNC: 1.2 NG/DL (ref 0.7–1.5)
TRIGL SERPL-MCNC: 205 MG/DL (ref ?–150)
TSH SERPL DL<=0.05 MIU/L-ACNC: 1.37 UIU/ML (ref 0.36–3.74)
VLDLC SERPL CALC-MCNC: 41 MG/DL
WBC # BLD AUTO: 5.6 K/UL (ref 4.6–13.2)

## 2018-06-11 PROCEDURE — 80061 LIPID PANEL: CPT | Performed by: INTERNAL MEDICINE

## 2018-06-11 PROCEDURE — 84439 ASSAY OF FREE THYROXINE: CPT | Performed by: INTERNAL MEDICINE

## 2018-06-11 PROCEDURE — 84443 ASSAY THYROID STIM HORMONE: CPT | Performed by: INTERNAL MEDICINE

## 2018-06-11 PROCEDURE — 83036 HEMOGLOBIN GLYCOSYLATED A1C: CPT | Performed by: INTERNAL MEDICINE

## 2018-06-11 PROCEDURE — 85025 COMPLETE CBC W/AUTO DIFF WBC: CPT | Performed by: INTERNAL MEDICINE

## 2018-06-11 PROCEDURE — 80053 COMPREHEN METABOLIC PANEL: CPT | Performed by: INTERNAL MEDICINE

## 2018-06-12 LAB
EST. AVERAGE GLUCOSE BLD GHB EST-MCNC: 108 MG/DL
HBA1C MFR BLD: 5.4 % (ref 4.2–5.6)

## 2018-06-14 ENCOUNTER — OFFICE VISIT (OUTPATIENT)
Dept: FAMILY MEDICINE CLINIC | Facility: CLINIC | Age: 68
End: 2018-06-14

## 2018-06-14 VITALS
RESPIRATION RATE: 15 BRPM | HEART RATE: 70 BPM | BODY MASS INDEX: 48.95 KG/M2 | OXYGEN SATURATION: 96 % | HEIGHT: 62 IN | TEMPERATURE: 96.9 F | SYSTOLIC BLOOD PRESSURE: 150 MMHG | DIASTOLIC BLOOD PRESSURE: 90 MMHG | WEIGHT: 266 LBS

## 2018-06-14 DIAGNOSIS — M54.50 ACUTE BILATERAL LOW BACK PAIN WITHOUT SCIATICA: ICD-10-CM

## 2018-06-14 DIAGNOSIS — E78.1 HYPERTRIGLYCERIDEMIA: ICD-10-CM

## 2018-06-14 DIAGNOSIS — Z00.00 MEDICARE ANNUAL WELLNESS VISIT, SUBSEQUENT: Primary | ICD-10-CM

## 2018-06-14 DIAGNOSIS — E66.01 OBESITY, CLASS III, BMI 40-49.9 (MORBID OBESITY) (HCC): ICD-10-CM

## 2018-06-14 DIAGNOSIS — Z71.89 ADVANCE DIRECTIVE DISCUSSED WITH PATIENT: ICD-10-CM

## 2018-06-14 DIAGNOSIS — I48.20 CHRONIC ATRIAL FIBRILLATION (HCC): ICD-10-CM

## 2018-06-14 RX ORDER — LIDOCAINE 50 MG/G
PATCH TOPICAL
Qty: 30 EACH | Refills: 3 | Status: SHIPPED | OUTPATIENT
Start: 2018-06-14 | End: 2018-06-14 | Stop reason: SDUPTHER

## 2018-06-14 RX ORDER — LIDOCAINE 50 MG/G
PATCH TOPICAL
Qty: 30 EACH | Refills: 3 | Status: SHIPPED | OUTPATIENT
Start: 2018-06-14 | End: 2018-10-16

## 2018-06-14 NOTE — ACP (ADVANCE CARE PLANNING)
Pt would like to appoint her niece, Geovany Edwards, as her medical decision maker in the event that she can no longer do so. Phone number is [phone]. Her secondary person is her son, Ghassan Cannon. Phone number is [phone]. He lives in [place]. An additional contact info for her son is his wife's information. The name of his wife is Wendi Forman. Her phone number is [phone]. If her death is imminent and medical treatment will not help her recover, pt does not want life prolonging measures. If her condition makes her unaware of herself or her surroundings and she cannot interact with others, pt does not want life prolonging measures.

## 2018-06-14 NOTE — ACP (ADVANCE CARE PLANNING)
Advance Care Planning 6/14/2018   Patient's Healthcare Decision Maker is: Named in scanned ACP document   Primary Decision Maker Name Jennifer Bruce   Primary Decision Maker Phone Number [phone]   Primary Decision Maker Relationship to Patient Other relative   Secondary Decision Maker Name Zee Vaca   Secondary Decision Maker Phone Number [phone]   Secondary Decision Maker Relationship to Patient Other relative   Confirm Advance Directive Yes, on file   Does the patient have other document types Other (comment)

## 2018-06-14 NOTE — MR AVS SNAPSHOT
[address] [id number] 
[phone] Patient: Eliz Ribeiro MRN: [id number] AUE:[id number] Visit Information Date & Time Provider Department Dept. Phone Encounter #  
 6/14/2018 11:00 AM Shawnee Drummond MD [facility] [phone] [id number] Follow-up Instructions Return in about 4 weeks (around 7/12/2018) for Follow up hypertension, Follow up hyperlipidemia, Follow up weight management, Follow up pain. Upcoming Health Maintenance Date Due COLONOSCOPY 12/31/2017 MEDICARE YEARLY EXAM 6/14/2018 Influenza Age 5 to Adult 8/1/2018 BREAST CANCER SCRN MAMMOGRAM 6/15/2019 GLAUCOMA SCREENING Q2Y 6/14/2020 DTaP/Tdap/Td series (2 - Td) 6/7/2026 Allergies as of 6/14/2018  Review Complete On: 6/14/2018 By: Shawnee Drummond MD  
 No Known Allergies Current Immunizations  Reviewed on 6/20/2017 Name Date Influenza Vaccine 9/8/2017, 9/8/2016, 10/19/2015, 10/23/2014 Influenza Vaccine (Quad) PF 9/29/2015  9:07 AM  
 Pneumococcal Conjugate (PCV-13) 9/8/2016 Pneumococcal Polysaccharide (PPSV-23) 10/23/2014 Zoster Vaccine, Live 6/13/2017 Not reviewed this visit You Were Diagnosed With   
  
 Codes Comments Medicare annual wellness visit, subsequent    -  Primary ICD-10-CM: Z00.00 ICD-9-CM: V70.0 Acute bilateral low back pain without sciatica     ICD-10-CM: M54.5 ICD-9-CM: 724.2, 338.19 Hypertriglyceridemia     ICD-10-CM: E78.1 ICD-9-CM: 272.1 Obesity, Class III, BMI 40-49.9 (morbid obesity) (HCC)     ICD-10-CM: E66.01 
ICD-9-CM: 278.01 Chronic atrial fibrillation (HCC)     ICD-10-CM: M11.3 ICD-9-CM: 427.31 Advance directive discussed with patient     ICD-10-CM: Z71.89 ICD-9-CM: V65.49 Vitals BP Pulse Temp Resp Height(growth percentile) Weight(growth percentile) 150/90 (BP 1 Location: Left arm, BP Patient Position: Sitting) 70 96.9 °F (36.1 °C) (Oral) 15 5' 1.75\" (1.568 m) 266 lb (120.7 kg) SpO2 BMI OB Status Smoking Status 96% 49.05 kg/m2 Menopause Never Smoker Vitals History BMI and BSA Data Body Mass Index Body Surface Area 49.05 kg/m 2 2.29 m 2 Preferred Pharmacy Pharmacy Name Phone [id number] [address]  [phone] Your Updated Medication List  
  
   
This list is accurate as of 6/14/18 12:03 PM.  Always use your most recent med list.  
  
  
  
  
 acetaminophen 650 mg Tber Commonly known as:  TYLENOL Take 2 Tabs by mouth every eight (8) hours. amLODIPine 5 mg tablet Commonly known as:  Ethel Railing Take 1 Tab by mouth daily. candesartan 32 mg tablet Commonly known as:  ATACAND Take 1 Tab by mouth daily. lidocaine 5 % Commonly known as:  Huzaina Ainsworth Apply patch to the affected area for 12 hours a day and remove for 12 hours a day. metoprolol tartrate 25 mg tablet Commonly known as:  LOPRESSOR Take 3 Tabs by mouth two (2) times a day. pravastatin 40 mg tablet Commonly known as:  PRAVACHOL Take 1 Tab by mouth nightly. warfarin 2.5 mg tablet Commonly known as:  COUMADIN Take 1 Tab by mouth daily. Prescriptions Sent to Pharmacy Refills  
 lidocaine (LIDODERM) 5 % 3 Sig: Apply patch to the affected area for 12 hours a day and remove for 12 hours a day. Class: Normal  
 Pharmacy: [facility] . Ph #: [phone] We Performed the Following ADVANCE CARE PLANNING FIRST 30 MINS [89465 CPT(R)] Follow-up Instructions Return in about 4 weeks (around 7/12/2018) for Follow up hypertension, Follow up hyperlipidemia, Follow up weight management, Follow up pain. Patient Instructions Information given on ketogenic/IF diet Introducing \[facility] [place] [facility]\"" & HEALTH SERVICES! Jhonny Cho introduces [employer] patient portal. Now you can access parts of your medical record, email your doctor's office, and request medication refills online. 1. In your internet browser, go to [email or web address] 2. Click on the First Time User? Click Here link in the Sign In box. You will see the New Member Sign Up page. 3. Enter your [employer] Access Code exactly as it appears below. You will not need to use this code after youve completed the sign-up process. If you do not sign up before the expiration date, you must request a new code. · [employer] Access Code: [email or web address] Expires: 7/1/2018  1:28 AM 
 
4. Enter the last four digits of your Social Security Number (xxxx) and Date of Birth (mm/dd/yyyy) as indicated and click Submit. You will be taken to the next sign-up page. 5. Create a [employer] ID. This will be your [employer] login ID and cannot be changed, so think of one that is secure and easy to remember. 6. Create a [employer] password. You can change your password at any time. 7. Enter your Password Reset Question and Answer. This can be used at a later time if you forget your password. 8. Enter your e-mail address. You will receive e-mail notification when new information is available in [address] 9. Click Sign Up. You can now view and download portions of your medical record. 10. Click the Download Summary menu link to download a portable copy of your medical information. If you have questions, please visit the Frequently Asked Questions section of the [employer] website. Remember, [employer] is NOT to be used for urgent needs. For medical emergencies, dial 911. Now available from your iPhone and Android! Please provide this summary of care documentation to your next provider. Your primary care clinician is listed as Wellington Samson.  If you have any questions after today's visit, please call [phone].

## 2018-06-14 NOTE — PROGRESS NOTES
Lionel Hill is a 70 y.o. female and presents for annual Medicare Wellness Visit. Problem List: Reviewed with patient and discussed risk factors. Patient Active Problem List   Diagnosis Code    Aortic valve stenosis I35.0    [facility] I48.91    Hypertension I10    Obesity, Class III, BMI 40-49.9 (morbid obesity) ([facility] E66.01    Osteoarthritis M19.90    Hypertriglyceridemia E78.1    Sick sinus syndrome [facility] I49.5    Acute bilateral low back pain without sciatica M54.5       Current medical providers:  Patient Care Team:  Antonio Washington MD as PCP - General (Internal Medicine)  Alis Jones MD (Cardiology)  Marimar Braxton DO (Cardiothoracic Surgery)  Mandi Coleman, [address] (Optometry)  Jerry Bach DDS as Physician (Dental General Practice)    [address]: Reviewed with patient  History reviewed. No pertinent surgical history. SH: Reviewed with patient  Social History   Substance Use Topics    Smoking status: Never Smoker    Smokeless tobacco: Never Used    Alcohol use No       FH: Reviewed with patient  Family History   Problem Relation Age of Onset    Heart Disease Father      unknown, ? MI at age 46    Arthritis-osteo Sister        Medications/Allergies: Reviewed with patient  Current Outpatient Prescriptions on File Prior to Visit   Medication Sig Dispense Refill    pravastatin (PRAVACHOL) 40 mg tablet Take 1 Tab by mouth nightly. 90 Tab 3    acetaminophen (TYLENOL) 650 mg TbER Take 2 Tabs by mouth every eight (8) hours. 90 Tab 3    amLODIPine (NORVASC) 5 mg tablet Take 1 Tab by mouth daily. 90 Tab 3    warfarin (COUMADIN) 2.5 mg tablet Take 1 Tab by mouth daily. 90 Tab 3    candesartan (ATACAND) 32 mg tablet Take 1 Tab by mouth daily. 90 Tab 3    metoprolol tartrate (LOPRESSOR) 25 mg tablet Take 3 Tabs by mouth two (2) times a day. 540 Tab 3     No current facility-administered medications on file prior to visit.        No Known Allergies    Objective:  Visit Vitals    /90 (BP 1 Location: Left arm, BP Patient Position: Sitting)  Comment (BP Patient Position): manual    Pulse 70    Temp 96.9 °F (36.1 °C) (Oral)    Resp 15    Ht 5' 1.75\" (1.568 m)    Wt 266 lb (120.7 kg)    SpO2 96%    BMI 49.05 kg/m2    Body mass index is 49.05 kg/(m^2). Assessment of cognitive impairment: Alert and oriented x 3  Depression Screen:   PHQ over the last two weeks 6/14/2018   PHQ Not Done -   Little interest or pleasure in doing things Not at all   Feeling down, depressed or hopeless Not at all   Total Score PHQ 2 0     Fall Risk Assessment:    Fall Risk Assessment, last 12 mths 6/14/2018   Able to walk? Yes   Fall in past 12 months? No     Functional Ability:   Does the patient exhibit a steady gait? yes   How long did it take the patient to get up and walk from a sitting position? 5 seconds   Is the patient self reliant?  (ie can do own laundry, meals, household chores)  yes   Does the patient handle his/her own medications? yes   Does the patient handle his/her own money? yes   Is the patients home safe (ie good lighting, handrails on stairs and bath, etc.)? yes   Did you notice or did patient express any hearing difficulties? Yes.  B/l hearing loss. Has hearing aids now,   Did you notice of did patient express any vision difficulties?   no   Were distance and reading eye charts used? yes     Advance Care Planning:   Patient was offered the opportunity to discuss advance care planning:  yes     Does patient have an Advance Directive:  Yes.     If no, did you provide information on Caring Connections?  no     Plan:    Orders Placed This Encounter    ADVANCE CARE PLANNING FIRST 30 MINS    DISCONTD: lidocaine (LIDODERM) 5 %    lidocaine (LIDODERM) 5 %     Health Maintenance   Topic Date Due    COLONOSCOPY  12/31/2017    MEDICARE YEARLY EXAM  06/14/2018    Influenza Age 5 to Adult  08/01/2018    BREAST CANCER SCRN MAMMOGRAM 06/15/2019    GLAUCOMA SCREENING Q2Y  2020    DTaP/Tdap/Td series (2 - Td) 2026    Hepatitis C Screening  Completed    Bone Densitometry (Dexa) Screening  Completed    ZOSTER VACCINE AGE 60>  Addressed    Pneumococcal 65+ Low/Medium Risk  Completed     Time spent counseling pt on advanced care plannin minutes    *Patient verbalized understanding and agreement with the plan. A copy of the After Visit Summary with personalized health plan was given to the patient today. Follow-up Disposition:  Return in about 4 weeks (around 2018) for Follow up hypertension, Follow up hyperlipidemia, Follow up weight management, Follow up pain. Burke Hernandez.  [address], MD - Internal Medicine  2018, 9:14 AM  [facility]  [address] [place] [address] [place], [address]  Phone [phone]  Fax [phone]

## 2018-06-14 NOTE — PROGRESS NOTES
Internal Medicine Progress Note    Today's Date:  2018   Patient:  Rula Latif  Patient :  1950    Subjective:     Chief Complaint   Patient presents with    Cholesterol Problem    Back Pain    Annual Wellness Visit    Weight Management      Hypertension   This is a chronic problem. BP is at goal. Pt takes lopressor, norvasc and candesartan. Pt reports compliance with these medications. Hyperlipidemia  This is a chronic problem. TG is not at goal. Last FLP was checked on 2018. Pt takes pravastatin. Atrial fibrillation  This is a chronic problem. This is at goal. Pt is on coumadin and a beta blocker. Pt was taken off amiodarone. Pt is seeing Dr. Albert Sullivan and Dr. Maya Yuen. Pt is s/p cardiac ablation and cardioversion. Obesity Class III   This is a chronic problem. This is not at goal. Pt does not exercise regularly. Pt tries to eat a healthy diet. Past Medical History:   Diagnosis Date    Acute bilateral low back pain without sciatica 2018    Advance directive discussed with patient 2016    Pt would like to appoint her son, Marques Peace as her medical decision maker in the event that she can no longer do so. Phone number is [phone]. Her secondary person is her niece, Nigel Zaidi. Phone number is [phone]. If her death is imminent and medical treatment will not help her recover, pt does not want life prolonging measures. If her condition makes her unaware of h    Dizziness 5/3/2016    Hypertriglyceridemia 2016    Obesity, Class III, BMI 40-49.9 (morbid obesity) ([address] 2015    Osteoarthritis     Shingles 2015     History reviewed. No pertinent surgical history. reports that she has never smoked. She has never used smokeless tobacco. She reports that she does not drink alcohol or use illicit drugs. Family History   Problem Relation Age of Onset    Heart Disease Father      unknown, ? MI at age 46    Arthritis-osteo Sister      No Known Allergies  Review of Systems   Positives in bold  CV:      chest pain, palpitations  PULM:  SOB, wheezing, cough, sputum production    Current Outpatient Meds and Allergies     Current Outpatient Prescriptions on File Prior to Visit   Medication Sig Dispense Refill    pravastatin (PRAVACHOL) 40 mg tablet Take 1 Tab by mouth nightly. 90 Tab 3    acetaminophen (TYLENOL) 650 mg TbER Take 2 Tabs by mouth every eight (8) hours. 90 Tab 3    amLODIPine (NORVASC) 5 mg tablet Take 1 Tab by mouth daily. 90 Tab 3    warfarin (COUMADIN) 2.5 mg tablet Take 1 Tab by mouth daily. 90 Tab 3    candesartan (ATACAND) 32 mg tablet Take 1 Tab by mouth daily. 90 Tab 3    metoprolol tartrate (LOPRESSOR) 25 mg tablet Take 3 Tabs by mouth two (2) times a day. 540 Tab 3     No current facility-administered medications on file prior to visit.       No Known Allergies  Objective:     VS:    Visit Vitals    /90 (BP 1 Location: Left arm, BP Patient Position: Sitting)  Comment (BP Patient Position): manual    Pulse 70    Temp 96.9 °F (36.1 °C) (Oral)    Resp 15    Ht 5' 1.75\" (1.568 m)    Wt 266 lb (120.7 kg)    SpO2 96%    BMI 49.05 kg/m2     General:   Well-nourished, well-groomed, pleasant, alert, in no acute distress  Head:  Normocephalic, atraumatic  Ears:  External ears WNL  Nose:  External nares WNL  Psych:  No pressured speech, no abnormal thought content    PHQ over the last two weeks 6/14/2018   PHQ Not Done -   Little interest or pleasure in doing things Not at all   Feeling down, depressed or hopeless Not at all   Total Score PHQ 2 0     Hospital Outpatient Visit on 06/11/2018   Component Date Value Ref Range Status    WBC 06/11/2018 5.6  4.6 - 13.2 K/uL Final    RBC 06/11/2018 4.60  4.20 - 5.30 M/uL Final    HGB 06/11/2018 14.0  12.0 - 16.0 g/dL Final    HCT 06/11/2018 43.0  35.0 - 45.0 % Final    MCV 06/11/2018 93.5  74.0 - 97.0 FL Final    MCH 06/11/2018 30.4  24.0 - 34.0 PG Final    MCHC 06/11/2018 32.6  31.0 - 37.0 g/dL Final    RDW 06/11/2018 13.7  11.6 - 14.5 % Final    PLATELET 47/98/0578 551  135 - 420 K/uL Final    MPV 06/11/2018 9.8  9.2 - 11.8 FL Final    NEUTROPHILS 06/11/2018 53  40 - 73 % Final    LYMPHOCYTES 06/11/2018 35  21 - 52 % Final    MONOCYTES 06/11/2018 10  3 - 10 % Final    EOSINOPHILS 06/11/2018 1  0 - 5 % Final    BASOPHILS 06/11/2018 1  0 - 2 % Final    ABS. NEUTROPHILS 06/11/2018 3.0  1.8 - 8.0 K/UL Final    ABS. LYMPHOCYTES 06/11/2018 2.0  0.9 - 3.6 K/UL Final    ABS. MONOCYTES 06/11/2018 0.6  0.05 - 1.2 K/UL Final    ABS. EOSINOPHILS 06/11/2018 0.1  0.0 - 0.4 K/UL Final    ABS. BASOPHILS 06/11/2018 0.0  0.0 - 0.06 K/UL Final    DF 06/11/2018 AUTOMATED    Final    LIPID PROFILE 06/11/2018        Final    Cholesterol, total 06/11/2018 181  <200 MG/DL Final    Triglyceride 06/11/2018 205* <150 MG/DL Final    Comment: The drugs N-acetylcysteine (NAC) and  Metamiszole have been found to cause falsely  low results in this chemical assay. Please  be sure to submit blood samples obtained  BEFORE administration of either of these  drugs to assure correct results.       HDL Cholesterol 06/11/2018 42  40 - 60 MG/DL Final    LDL, calculated 06/11/2018 98  0 - 100 MG/DL Final    VLDL, calculated 06/11/2018 41  MG/DL Final    CHOL/HDL Ratio 06/11/2018 4.3  0 - 5.0   Final    Sodium 06/11/2018 142  136 - 145 mmol/L Final    Potassium 06/11/2018 4.2  3.5 - 5.5 mmol/L Final    Chloride 06/11/2018 107  100 - 108 mmol/L Final    CO2 06/11/2018 28  21 - 32 mmol/L Final    Anion gap 06/11/2018 7  3.0 - 18 mmol/L Final    Glucose 06/11/2018 91  74 - 99 mg/dL Final    BUN 06/11/2018 19* 7.0 - 18 MG/DL Final    Creatinine 06/11/2018 0.74  0.6 - 1.3 MG/DL Final    BUN/Creatinine ratio 06/11/2018 26* 12 - 20   Final    GFR est AA 06/11/2018 >60  >60 ml/min/1.73m2 Final    GFR est non-AA 06/11/2018 >60  >60 ml/min/1.73m2 Final    Comment: (NOTE)  Estimated GFR is calculated using the Modification of Diet in Renal   Disease (MDRD) Study equation, reported for both  Americans   (GFRAA) and non- Americans (GFRNA), and normalized to 1.73m2   body surface area. The physician must decide which value applies to   the patient. The MDRD study equation should only be used in   individuals age 25 or older. It has not been validated for the   following: pregnant women, patients with serious comorbid conditions,   or on certain medications, or persons with extremes of body size,   muscle mass, or nutritional status.  Calcium 06/11/2018 8.2* 8.5 - 10.1 MG/DL Final    Bilirubin, total 06/11/2018 0.8  0.2 - 1.0 MG/DL Final    ALT (SGPT) 06/11/2018 26  13 - 56 U/L Final    AST (SGOT) 06/11/2018 24  15 - 37 U/L Final    Alk. phosphatase 06/11/2018 68  45 - 117 U/L Final    Protein, total 06/11/2018 6.6  6.4 - 8.2 g/dL Final    Albumin 06/11/2018 3.9  3.4 - 5.0 g/dL Final    Globulin 06/11/2018 2.7  2.0 - 4.0 g/dL Final    A-G Ratio 06/11/2018 1.4  0.8 - 1.7   Final    T4, Free 06/11/2018 1.2  0.7 - 1.5 NG/DL Final    TSH 06/11/2018 1.37  0.36 - 3.74 uIU/mL Final    Hemoglobin A1c 06/11/2018 5.4  4.2 - 5.6 % Final    Comment: (NOTE)  HbA1C Interpretive Ranges  <5.7              Normal  5.7 - 6.4         Consider Prediabetes  >6.5              Consider Diabetes      Est. average glucose 06/11/2018 108  mg/dL Final    Comment: (NOTE)  The eAG should be interpreted with patient characteristics in mind   since ethnicity, interindividual differences, red cell lifespan,   variation in rates of glycation, etc. may affect the validity of the   calculation. Assessment/Plan & Orders:         ICD-10-CM ICD-9-CM    1. Medicare annual wellness visit, subsequent Z00.00 V70.0    2. Acute bilateral low back pain without sciatica M54.5 724.2 lidocaine (LIDODERM) 5 %     338.19 DISCONTINUED: lidocaine (LIDODERM) 5 %   3. Hypertriglyceridemia E78.1 272.1    4.  Obesity, Class III, BMI 40-49.9 (morbid obesity) ([facility] [address] E66.01 278.01    5. Chronic atrial fibrillation (HCC) I48.2 427.31    6. Advance directive discussed with patient Z71.Edmundo V65.49 ADVANCE CARE PLANNING FIRST 27 MINS     Healthy lifestyle has been encouraged including avoidance of tobacco, limiting or avoiding alcohol intake, heart healthy diet which is low in cholesterol and saturated fat and contains fresh fruits, vegetables and whole grains and fiber, regular exercise with goals of 20-30 minutes 3-5 days weekly and maintaining an optimal BMI. Information given on ketogenic/IF diet  Follow up with cardiology  Pt needs to schedule a colonoscopy or do a fit test  Depression screenin17    Follow-up Disposition:  Return in about 4 weeks (around 2018) for Follow up hypertension, Follow up hyperlipidemia, Follow up weight management, Follow up pain. *Patient verbalized understanding and agreement with the plan. Patient was given an after-visit summary. Chris Ni.  [address], MD - Internal Medicine  2018, 9:38 AM  [facility]  [facility] Kate Delgado, [address]  Phone [phone]  Fax [phone]

## 2018-06-14 NOTE — PROGRESS NOTES
Visit Vitals    /90 (BP 1 Location: Left arm, BP Patient Position: Sitting)  Comment (BP Patient Position): manual    Pulse 70    Temp 96.9 °F (36.1 °C) (Oral)    Resp 15    Ht 5' 1.75\" (1.568 m)    Wt 266 lb (120.7 kg)    SpO2 96%    BMI 49.05 kg/m2

## 2018-06-14 NOTE — PROGRESS NOTES
Shelly Freire is a 70 y.o.  female presents today for office visit for   Chief Complaint   Patient presents with    Cholesterol Problem    Back Pain    Annual Wellness Visit   Pt is  fasting. Pt is in Room # 3.      1. Have you been to the ER, urgent care clinic since your last visit? Hospitalized since your last visit? No    2. Have you seen or consulted any other health care providers outside of the [facility] since your last visit? Include any pap smears or colon screening. Yes When: 4/2018 Where: Dr. Violette Rodriguez, Optometry Reason for visit: Routine Eye Exam    Health Maintenance reviewed. Requested Prescriptions      No prescriptions requested or ordered in this encounter       Visit Vitals    /90 (BP 1 Location: Left arm, BP Patient Position: Sitting)  Comment (BP Patient Position): manual    Pulse 70    Temp 96.9 °F (36.1 °C) (Oral)    Resp 15    Ht 5' 1.75\" (1.568 m)    Wt 266 lb (120.7 kg)    SpO2 96%    BMI 49.05 kg/m2         Upcoming Appts  Yes Cardiology - TBA   Dentist, Dr. Ortez Marker -  7/2018    Health Care Directive or Living Will: yes       Depression Risk Factor Screening:      Patient Health Questionnaire (PHQ-2)   Over the last 2 weeks, how often have you been bothered by any of the following problems? · Little interest or pleasure in doing things? · Not at all. [0]  · Feeling down, depressed, or hopeless? · Not at all. [0]    Total Score: 0/6  PHQ-2 Assessment Scoring:   A score of 2 or more requires further screening with the PHQ-9    Alcohol Risk Factor Screening:     Women: On any occasion during the past 3 months, have you had more than 3 drinks containing alcohol? No  Do you average more than 7 drinks per week? No      Functional Ability and Level of Safety:     Hearing Loss    Hearing is good. Whisper Test done with normal results.      Visual Acuity Screening    Right eye Left eye Both eyes   Without correction:      With correction: 20/25 20/25 20/25 Hearing Screening Comments: Whisper Test - passed. Activities of Daily Living   Self-care.    Requires assistance with: no ADLs    Fall Risk   No fall risk factors    Abuse Screen   Patient is not abused  None        Patient Care Team:  Kojo Khan MD as PCP - General (Internal Medicine)  Kristin Grijalva MD (Cardiology)  Pedro Luis Groves DO (Cardiothoracic Surgery)  Elizabeth Varner, [address] (Optometry)  Priyanka Gómez DDS as Physician (Dental General Practice)

## 2018-06-29 LAB — MAMMOGRAPHY, EXTERNAL: NEGATIVE

## 2018-07-17 ENCOUNTER — OFFICE VISIT (OUTPATIENT)
Dept: FAMILY MEDICINE CLINIC | Facility: CLINIC | Age: 68
End: 2018-07-17

## 2018-07-17 VITALS
HEIGHT: 61 IN | DIASTOLIC BLOOD PRESSURE: 80 MMHG | WEIGHT: 256 LBS | TEMPERATURE: 97.8 F | RESPIRATION RATE: 18 BRPM | BODY MASS INDEX: 48.33 KG/M2 | HEART RATE: 69 BPM | OXYGEN SATURATION: 94 % | SYSTOLIC BLOOD PRESSURE: 122 MMHG

## 2018-07-17 DIAGNOSIS — E66.01 OBESITY, CLASS III, BMI 40-49.9 (MORBID OBESITY) (HCC): ICD-10-CM

## 2018-07-17 DIAGNOSIS — I48.20 CHRONIC ATRIAL FIBRILLATION (HCC): ICD-10-CM

## 2018-07-17 DIAGNOSIS — I10 ESSENTIAL HYPERTENSION: ICD-10-CM

## 2018-07-17 DIAGNOSIS — M17.12 PRIMARY OSTEOARTHRITIS OF LEFT KNEE: Primary | ICD-10-CM

## 2018-07-17 PROBLEM — M54.50 ACUTE BILATERAL LOW BACK PAIN WITHOUT SCIATICA: Status: RESOLVED | Noted: 2018-06-14 | Resolved: 2018-07-17

## 2018-07-17 NOTE — MR AVS SNAPSHOT
[place] 
 
 
 [address] [facility] [id number] [phone] Patient: Yoni Eubanks MRN: [id number] Visit Information Date & Time Provider Department Dept. Phone Encounter #  
 7/17/2018  9:30 AM Cristian Isaac MD [facility] [phone] [id number] Follow-up Instructions Return in about 3 months (around 10/17/2018) for Follow up hypertension, Follow up weight management, Follow up pain, Follow up hyperlipidemia. Upcoming Health Maintenance Date Due COLONOSCOPY 12/31/2017 Influenza Age 5 to Adult 8/1/2018 MEDICARE YEARLY EXAM 6/15/2019 GLAUCOMA SCREENING Q2Y 6/14/2020 BREAST CANCER SCRN MAMMOGRAM 6/29/2020 DTaP/Tdap/Td series (2 - Td) 6/7/2026 Allergies as of 7/17/2018  Review Complete On: 7/17/2018 By: Russ Diallo LPN No Known Allergies Current Immunizations  Reviewed on 6/20/2017 Name Date Influenza Vaccine 9/8/2017, 9/8/2016, 10/19/2015, 10/23/2014 Influenza Vaccine (Quad) PF 9/29/2015  9:07 AM  
 Pneumococcal Conjugate (PCV-13) 9/8/2016 Pneumococcal Polysaccharide (PPSV-23) 10/23/2014 Zoster Vaccine, Live 6/13/2017 Not reviewed this visit You Were Diagnosed With   
  
 Codes Comments Primary osteoarthritis of left knee    -  Primary ICD-10-CM: M17.12 
ICD-9-CM: 715.16 Obesity, Class III, BMI 40-49.9 (morbid obesity) (HCC)     ICD-10-CM: E66.01 
ICD-9-CM: 278.01 Essential hypertension     ICD-10-CM: I10 
ICD-9-CM: 401.9 Chronic atrial fibrillation (HCC)     ICD-10-CM: C59.2 ICD-9-CM: 427.31 Vitals BP Pulse Temp Resp Height(growth percentile) Weight(growth percentile) 122/80 (BP 1 Location: Right arm, BP Patient Position: Sitting) 69 97.8 °F (36.6 °C) (Oral) 18 5' 1\" (1.549 m) 256 lb (116.1 kg) SpO2 BMI OB Status Smoking Status 94% 48.37 kg/m2 Menopause Never Smoker Vitals History BMI and BSA Data Body Mass Index Body Surface Area  
 48.37 kg/m 2 2.24 m 2 Preferred Pharmacy Pharmacy Name Phone [facility] [phone] Your Updated Medication List  
  
   
This list is accurate as of 7/17/18 10:03 AM.  Always use your most recent med list.  
  
  
  
  
 acetaminophen 650 mg Tber Commonly known as:  TYLENOL Take 2 Tabs by mouth every eight (8) hours. amLODIPine 5 mg tablet Commonly known as:  Plummer Roman Take 1 Tab by mouth daily. candesartan 32 mg tablet Commonly known as:  ATACAND Take 1 Tab by mouth daily. lidocaine 5 % Commonly known as:  Donnis Fleischer Apply patch to the affected area for 12 hours a day and remove for 12 hours a day. metoprolol tartrate 25 mg tablet Commonly known as:  LOPRESSOR Take 3 Tabs by mouth two (2) times a day. pravastatin 40 mg tablet Commonly known as:  PRAVACHOL Take 1 Tab by mouth nightly. warfarin 2.5 mg tablet Commonly known as:  COUMADIN Take 1 Tab by mouth daily. We Performed the Following REFERRAL TO ORTHOPEDIC SURGERY [REF62 Custom] Follow-up Instructions Return in about 3 months (around 10/17/2018) for Follow up hypertension, Follow up weight management, Follow up pain, Follow up hyperlipidemia. Referral Information Referral ID Referred By Referred To  
  
 [id number] [address], [place]   
   [address] [place] [address] [address] [facility] Phone: [phone] Fax: [phone] Visits Status Start Date End Date 1 New Request 7/17/18 7/17/19 If your referral has a status of pending review or denied, additional information will be sent to support the outcome of this decision. Introducing [facility] HE[facility]! [facility] introduces [employer] patient portal. Now you can access parts of your medical record, email your doctor's office, and request medication refills online. 1. In your internet browser, go to [email or web address] 2. Click on the First Time User? Click Here link in the Sign In box. You will see the New Member Sign Up page. 3. Enter your [employer] Access Code exactly as it appears below. You will not need to use this code after youve completed the sign-up process. If you do not sign up before the expiration date, you must request a new code. · [employer] Access Code: [email or web address] Expires: 10/15/2018 10:02 AM 
 
4. Enter the last four digits of your Social Security Number (xxxx) and Date of Birth (mm/dd/yyyy) as indicated and click Submit. You will be taken to the next sign-up page. 5. Create a [email or web address] ID. This will be your [employer] login ID and cannot be changed, so think of one that is secure and easy to remember. 6. Create a [employer] password. You can change your password at any time. 7. Enter your Password Reset Question and Answer. This can be used at a later time if you forget your password. 8. Enter your e-mail address. You will receive e-mail notification when new information is available in [address] 9. Click Sign Up. You can now view and download portions of your medical record. 10. Click the Download Summary menu link to download a portable copy of your medical information. If you have questions, please visit the Frequently Asked Questions section of the [employer] website. Remember, [employer] is NOT to be used for urgent needs. For medical emergencies, dial 911. Now available from your iPhone and Android! Please provide this summary of care documentation to your next provider. Your primary care clinician is listed as Cordell Snowden. If you have any questions after today's visit, please call [phone].

## 2018-07-17 NOTE — PATIENT INSTRUCTIONS
Arthritis: Care Instructions  Your Care Instructions  Arthritis, also called osteoarthritis, is a breakdown of the cartilage that cushions your joints. When the cartilage wears down, your bones rub against each other. This causes pain and stiffness. Many people have some arthritis as they age. Arthritis most often affects the joints of the spine, hands, hips, knees, or feet. You can take simple measures to protect your joints, ease your pain, and help you stay active. Follow-up care is a key part of your treatment and safety. Be sure to make and go to all appointments, and call your doctor if you are having problems. It's also a good idea to know your test results and keep a list of the medicines you take. How can you care for yourself at home? · Stay at a healthy weight. Being overweight puts extra strain on your joints. · Talk to your doctor or physical therapist about exercises that will help ease joint pain. ¨ Stretch. You may enjoy gentle forms of yoga to help keep your joints and muscles flexible. ¨ Walk instead of jog. Other types of exercise that are less stressful on the joints include riding a bicycle, swimming, lala chi, or water exercise. ¨ Lift weights. Strong muscles help reduce stress on your joints. Stronger thigh muscles, for example, take some of the stress off of the knees and hips. Learn the right way to lift weights so you do not make joint pain worse. · Take your medicines exactly as prescribed. Call your doctor if you think you are having a problem with your medicine. · Take pain medicines exactly as directed. ¨ If the doctor gave you a prescription medicine for pain, take it as prescribed. ¨ If you are not taking a prescription pain medicine, ask your doctor if you can take an over-the-counter medicine. · Use a cane, crutch, walker, or another device if you need help to get around. These can help rest your joints.  You also can use other things to make life easier, such as a higher toilet seat and padded handles on kitchen utensils. · Do not sit in low chairs, which can make it hard to get up. · Put heat or cold on your sore joints as needed. Use whichever helps you most. You also can take turns with hot and cold packs. ¨ Apply heat 2 or 3 times a day for 20 to 30 minutes-using a heating pad, hot shower, or hot pack-to relieve pain and stiffness. ¨ Put ice or a cold pack on your sore joint for 10 to 20 minutes at a time. Put a thin cloth between the ice and your skin. When should you call for help? Call your doctor now or seek immediate medical care if:    · You have sudden swelling, warmth, or pain in any joint.     · You have joint pain and a fever or rash.     · You have such bad pain that you cannot use a joint.    Watch closely for changes in your health, and be sure to contact your doctor if:    · You have mild joint symptoms that continue even with more than 6 weeks of care at home.     · You have stomach pain or other problems with your medicine. Where can you learn more? Go to http://yaritza-lucy.info/. Enter G546 in the search box to learn more about \"Arthritis: Care Instructions. \"  Current as of: October 10, 2017  Content Version: 11.7  © 8309-0929 [employer]. Care instructions adapted under license by [employer] (which disclaims liability or warranty for this information). If you have questions about a medical condition or this instruction, always ask your healthcare professional. [place] [address] any warranty or liability for your use of this information.

## 2018-07-17 NOTE — PROGRESS NOTES
Internal Medicine Progress Note    Today's Date:  2018   Patient:  Vibha Rawls  Patient :  1950    Subjective:     Chief Complaint   Patient presents with    Cholesterol Problem    Hypertension    Weight Management    Knee Pain     bilateral      Hypertension   This is a chronic problem. BP is at goal. Pt takes lopressor, norvasc and candesartan. Pt reports compliance with these medications. Hyperlipidemia  This is a chronic problem. TG is not at goal. Last FLP was checked on 2018. Pt takes pravastatin. Atrial fibrillation  This is a chronic problem. This is at goal. Pt is on coumadin and a beta blocker. Pt was taken off amiodarone. Pt is seeing Dr. Flory Ordaz and Dr. Tasha Dunn. Pt is s/p cardiac ablation and cardioversion. Obesity Class III   This is a chronic problem. This is not at goal. Pt does not exercise regularly due to left knee pain. Both knees hurt but the left one is worse. Pt is on the ketogenic/IF diet. She lost 10 lb in 4 wks. Past Medical History:   Diagnosis Date    Acute bilateral low back pain without sciatica 2018    Advance directive discussed with patient 2016    Pt would like to appoint her son, Abbey Paulino as her medical decision maker in the event that she can no longer do so. Phone number is [phone]. Her secondary person is her niece, Francoise Vera. Phone number is [phone]. If her death is imminent and medical treatment will not help her recover, pt does not want life prolonging measures. If her condition makes her unaware of h    Dizziness 5/3/2016    Hypertriglyceridemia 2016    Obesity, Class III, BMI 40-49.9 (morbid obesity) ([facility] [address] 2015    Osteoarthritis     Shingles 2015     History reviewed. No pertinent surgical history. reports that she has never smoked. She has never used smokeless tobacco. She reports that she does not drink alcohol or use illicit drugs.   Family History   Problem Relation Age of Onset    Heart Disease Father      unknown, ? MI at age 46    Arthritis-osteo Sister      No Known Allergies  Review of Systems   Positives in bold  CV:      chest pain, palpitations  PULM:  SOB, wheezing, cough, sputum production    Current Outpatient Meds and Allergies     Current Outpatient Prescriptions on File Prior to Visit   Medication Sig Dispense Refill    pravastatin (PRAVACHOL) 40 mg tablet Take 1 Tab by mouth nightly. 90 Tab 3    acetaminophen (TYLENOL) 650 mg TbER Take 2 Tabs by mouth every eight (8) hours. 90 Tab 3    amLODIPine (NORVASC) 5 mg tablet Take 1 Tab by mouth daily. 90 Tab 3    warfarin (COUMADIN) 2.5 mg tablet Take 1 Tab by mouth daily. 90 Tab 3    candesartan (ATACAND) 32 mg tablet Take 1 Tab by mouth daily. 90 Tab 3    metoprolol tartrate (LOPRESSOR) 25 mg tablet Take 3 Tabs by mouth two (2) times a day. 540 Tab 3    lidocaine (LIDODERM) 5 % Apply patch to the affected area for 12 hours a day and remove for 12 hours a day. 30 Each 3     No current facility-administered medications on file prior to visit.       No Known Allergies  Objective:     VS:    Visit Vitals    /80 (BP 1 Location: Right arm, BP Patient Position: Sitting)    Pulse 69    Temp 97.8 °F (36.6 °C) (Oral)    Resp 18    Ht 5' 1\" (1.549 m)    Wt 256 lb (116.1 kg)    SpO2 94%    BMI 48.37 kg/m2     General:   Well-nourished, well-groomed, pleasant, alert, in no acute distress  Head:  Normocephalic, atraumatic  Ears:  External ears WNL  Nose:  External nares WNL  Psych:  No pressured speech, no abnormal thought content    PHQ over the last two weeks 6/14/2018   PHQ Not Done -   Little interest or pleasure in doing things Not at all   Feeling down, depressed or hopeless Not at all   Total Score PHQ 2 0     Office Visit on 06/14/2018   Component Date Value Ref Range Status    Mammography, External 06/29/2018 negative   Final   Hospital Outpatient Visit on 06/11/2018   Component Date Value Ref Range Status    WBC 06/11/2018 5.6  4.6 - 13.2 K/uL Final    RBC 06/11/2018 4.60  4.20 - 5.30 M/uL Final    HGB 06/11/2018 14.0  12.0 - 16.0 g/dL Final    HCT 06/11/2018 43.0  35.0 - 45.0 % Final    MCV 06/11/2018 93.5  74.0 - 97.0 FL Final    MCH 06/11/2018 30.4  24.0 - 34.0 PG Final    MCHC 06/11/2018 32.6  31.0 - 37.0 g/dL Final    RDW 06/11/2018 13.7  11.6 - 14.5 % Final    PLATELET 59/59/4293 244  135 - 420 K/uL Final    MPV 06/11/2018 9.8  9.2 - 11.8 FL Final    NEUTROPHILS 06/11/2018 53  40 - 73 % Final    LYMPHOCYTES 06/11/2018 35  21 - 52 % Final    MONOCYTES 06/11/2018 10  3 - 10 % Final    EOSINOPHILS 06/11/2018 1  0 - 5 % Final    BASOPHILS 06/11/2018 1  0 - 2 % Final    ABS. NEUTROPHILS 06/11/2018 3.0  1.8 - 8.0 K/UL Final    ABS. LYMPHOCYTES 06/11/2018 2.0  0.9 - 3.6 K/UL Final    ABS. MONOCYTES 06/11/2018 0.6  0.05 - 1.2 K/UL Final    ABS. EOSINOPHILS 06/11/2018 0.1  0.0 - 0.4 K/UL Final    ABS. BASOPHILS 06/11/2018 0.0  0.0 - 0.06 K/UL Final    DF 06/11/2018 AUTOMATED    Final    LIPID PROFILE 06/11/2018        Final    Cholesterol, total 06/11/2018 181  <200 MG/DL Final    Triglyceride 06/11/2018 205* <150 MG/DL Final    Comment: The drugs N-acetylcysteine (NAC) and  Metamiszole have been found to cause falsely  low results in this chemical assay. Please  be sure to submit blood samples obtained  BEFORE administration of either of these  drugs to assure correct results.       HDL Cholesterol 06/11/2018 42  40 - 60 MG/DL Final    LDL, calculated 06/11/2018 98  0 - 100 MG/DL Final    VLDL, calculated 06/11/2018 41  MG/DL Final    CHOL/HDL Ratio 06/11/2018 4.3  0 - 5.0   Final    Sodium 06/11/2018 142  136 - 145 mmol/L Final    Potassium 06/11/2018 4.2  3.5 - 5.5 mmol/L Final    Chloride 06/11/2018 107  100 - 108 mmol/L Final    CO2 06/11/2018 28  21 - 32 mmol/L Final    Anion gap 06/11/2018 7  3.0 - 18 mmol/L Final    Glucose 06/11/2018 91  74 - 99 mg/dL Final    BUN 06/11/2018 19* 7.0 - 18 MG/DL Final    Creatinine 06/11/2018 0.74  0.6 - 1.3 MG/DL Final    BUN/Creatinine ratio 06/11/2018 26* 12 - 20   Final    GFR est AA 06/11/2018 >60  >60 ml/min/1.73m2 Final    GFR est non-AA 06/11/2018 >60  >60 ml/min/1.73m2 Final    Comment: (NOTE)  Estimated GFR is calculated using the Modification of Diet in Renal   Disease (MDRD) Study equation, reported for both  Americans   (GFRAA) and non- Americans (GFRNA), and normalized to 1.73m2   body surface area. The physician must decide which value applies to   the patient. The MDRD study equation should only be used in   individuals age 25 or older. It has not been validated for the   following: pregnant women, patients with serious comorbid conditions,   or on certain medications, or persons with extremes of body size,   muscle mass, or nutritional status.  Calcium 06/11/2018 8.2* 8.5 - 10.1 MG/DL Final    Bilirubin, total 06/11/2018 0.8  0.2 - 1.0 MG/DL Final    ALT (SGPT) 06/11/2018 26  13 - 56 U/L Final    AST (SGOT) 06/11/2018 24  15 - 37 U/L Final    Alk. phosphatase 06/11/2018 68  45 - 117 U/L Final    Protein, total 06/11/2018 6.6  6.4 - 8.2 g/dL Final    Albumin 06/11/2018 3.9  3.4 - 5.0 g/dL Final    Globulin 06/11/2018 2.7  2.0 - 4.0 g/dL Final    A-G Ratio 06/11/2018 1.4  0.8 - 1.7   Final    T4, Free 06/11/2018 1.2  0.7 - 1.5 NG/DL Final    TSH 06/11/2018 1.37  0.36 - 3.74 uIU/mL Final    Hemoglobin A1c 06/11/2018 5.4  4.2 - 5.6 % Final    Comment: (NOTE)  HbA1C Interpretive Ranges  <5.7              Normal  5.7 - 6.4         Consider Prediabetes  >6.5              Consider Diabetes      Est. average glucose 06/11/2018 108  mg/dL Final    Comment: (NOTE)  The eAG should be interpreted with patient characteristics in mind   since ethnicity, interindividual differences, red cell lifespan,   variation in rates of glycation, etc. may affect the validity of the   calculation. Assessment/Plan & Orders:         ICD-10-CM ICD-9-CM    1. Primary osteoarthritis of left knee M17.12 715.16 REFERRAL TO ORTHOPEDIC SURGERY   2. Obesity, Class III, BMI 40-49.9 (morbid obesity) (HCC) E66.01 278.01    3. Essential hypertension I10 401.9    4. Chronic atrial fibrillation (HCC) I48.2 427.31       Healthy lifestyle has been encouraged including avoidance of tobacco, limiting or avoiding alcohol intake, heart healthy diet which is low in cholesterol and saturated fat and contains fresh fruits, vegetables and whole grains and fiber, regular exercise with goals of 20-30 minutes 3-5 days weekly and maintaining an optimal BMI. Continue ketogenic/IF diet with exercise  Follow up with cardiology  Pt needs to schedule a colonoscopy or do a fit test  If weight loss persists and bp is still at goal/low, consider stopping norvasc  Depression screenin17    Follow-up Disposition:  Return in about 3 months (around 10/17/2018) for Follow up hypertension, Follow up weight management, Follow up pain, Follow up hyperlipidemia. *Patient verbalized understanding and agreement with the plan. Patient was given an after-visit summary. Cameron Torrez.  [address], MD - Internal Medicine  2018, 9:38 AM  [facility]  [facility] [place], [address]  Phone [phone]  Fax [phone]

## 2018-07-17 NOTE — PROGRESS NOTES
Mattie Little is 70 y.o. female presents today for office visit for follow up for hypertension and weight management. Pt c/o bilateral knee pain at a 7/10 and states that she takes Tylenol daily. Pt is fasting. 1. Clinic since your last visit? Hospitalized since your last visit? NO    2. Have you seen or consulted any other health care providers outside of the [address] [place] [address] [place] since your last visit? Include any pap smears or colon screening. NO    Health Maintenance reviewed and pt reminded to schedule colonoscopy.       Upcoming Appts  NONE    Requested Prescriptions      No prescriptions requested or ordered in this encounter       Visit Vitals    /80 (BP 1 Location: Right arm, BP Patient Position: Sitting)    Pulse 69    Temp 97.8 °F (36.6 °C) (Oral)    Resp 18    Ht 5' 1\" (1.549 m)    Wt 256 lb (116.1 kg)    SpO2 94%    BMI 48.37 kg/m2

## 2018-10-16 ENCOUNTER — OFFICE VISIT (OUTPATIENT)
Dept: FAMILY MEDICINE CLINIC | Facility: CLINIC | Age: 68
End: 2018-10-16

## 2018-10-16 VITALS
SYSTOLIC BLOOD PRESSURE: 130 MMHG | HEART RATE: 69 BPM | OXYGEN SATURATION: 95 % | BODY MASS INDEX: 48.56 KG/M2 | DIASTOLIC BLOOD PRESSURE: 84 MMHG | HEIGHT: 61 IN | WEIGHT: 257.2 LBS | TEMPERATURE: 98.3 F | RESPIRATION RATE: 18 BRPM

## 2018-10-16 DIAGNOSIS — I10 ESSENTIAL HYPERTENSION: Primary | ICD-10-CM

## 2018-10-16 DIAGNOSIS — E66.01 OBESITY, CLASS III, BMI 40-49.9 (MORBID OBESITY) (HCC): ICD-10-CM

## 2018-10-16 DIAGNOSIS — E78.1 HYPERTRIGLYCERIDEMIA: ICD-10-CM

## 2018-10-16 DIAGNOSIS — I48.20 CHRONIC ATRIAL FIBRILLATION (HCC): ICD-10-CM

## 2018-10-16 DIAGNOSIS — Z13.31 SCREENING FOR DEPRESSION: ICD-10-CM

## 2018-10-16 RX ORDER — METOPROLOL TARTRATE 25 MG/1
75 TABLET, FILM COATED ORAL
COMMUNITY
End: 2018-10-16

## 2018-10-16 NOTE — PROGRESS NOTES
Arpita Rodríguez is 70 y.o. female presents today for office visit for follow up for hypertension. Pt is fasting. Pt is in Room# 3.  
 
1. Have you been to the ER, urgent care clinic since your last visit? Hospitalized since your last visit? no 
 
2. Have you seen or consulted any other health care providers outside of the [address] [place] [address] since your last visit? Include any pap smears or colon screening. Cardiology- 9/2018 Health Maintenance reviewed and pt reminded to schedule colonoscopy. Upcoming Appts 12/2018- cardio Requested Prescriptions No prescriptions requested or ordered in this encounter Visit Vitals  /84 (BP 1 Location: Right arm, BP Patient Position: Sitting)  Pulse 69  Temp 98.3 °F (36.8 °C) (Oral)  Resp 18  Ht 5' 1\" (1.549 m)  Wt 257 lb 3.2 oz (116.7 kg)  SpO2 95%  BMI 48.6 kg/m2

## 2018-10-16 NOTE — MR AVS SNAPSHOT
Noah Sell 
 
 
 [address] [place] [address] [phone] 
[phone] Patient: Rafael Villatoro MRN: [id number] PQU:[id number] Visit Information Date & Time Provider Department Dept. Phone Encounter #  
 10/16/2018  9:00 AM Librado Lesches, [facility] [phone] [id number] Follow-up Instructions Return in about 6 months (around 4/16/2019) for Follow up hypertension, Follow up weight management, Follow up hyperlipidemia. Upcoming Health Maintenance Date Due Shingrix Vaccine Age 50> (1 of 2) 2/15/1999 COLONOSCOPY 12/31/2017 MEDICARE YEARLY EXAM 6/15/2019 GLAUCOMA SCREENING Q2Y 6/14/2020 BREAST CANCER SCRN MAMMOGRAM 6/29/2020 DTaP/Tdap/Td series (2 - Td) 6/7/2026 Allergies as of 10/16/2018  Review Complete On: 10/16/2018 By: Librado Lesches, MD  
 No Known Allergies Current Immunizations  Reviewed on 6/20/2017 Name Date Influenza Vaccine 9/8/2017, 9/8/2016, 10/19/2015, 10/23/2014 Influenza Vaccine (Quad) PF 9/29/2015  9:07 AM  
 Influenza Vaccine (Tri) Adjuvanted 9/8/2018 Pneumococcal Conjugate (PCV-13) 9/8/2016 Pneumococcal Polysaccharide (PPSV-23) 10/23/2014 Zoster Vaccine, Live 6/13/2017 Not reviewed this visit You Were Diagnosed With   
  
 Codes Comments Screening for depression    -  Primary ICD-10-CM: Z13.31 
ICD-9-CM: V79.0 Vitals BP Pulse Temp Resp Height(growth percentile) Weight(growth percentile) 130/84 (BP 1 Location: Right arm, BP Patient Position: Sitting) 69 98.3 °F (36.8 °C) (Oral) 18 5' 1\" (1.549 m) 257 lb 3.2 oz (116.7 kg) SpO2 BMI OB Status Smoking Status 95% 48.6 kg/m2 Menopause Never Smoker Vitals History BMI and BSA Data Body Mass Index Body Surface Area  
 48.6 kg/m 2 2.24 m 2 Preferred Pharmacy Pharmacy Name Phone [facility] [place] [phone] Your Updated Medication List  
  
   
 This list is accurate as of 10/16/18  9:41 AM.  Always use your most recent med list.  
  
  
  
  
 acetaminophen 650 mg Tber Commonly known as:  TYLENOL Take 2 Tabs by mouth every eight (8) hours. amLODIPine 5 mg tablet Commonly known as:  Lue Jensen Take 1 Tab by mouth daily. candesartan 32 mg tablet Commonly known as:  ATACAND Take 1 Tab by mouth daily. FLUAD 0214-0548 (65 YR UP)(PF) Syrg injection Generic drug:  influenza vaccine 2018-19 (65 yrs+)(PF)  
inject 0.5 milliliter intramuscularly  
  
 metoprolol tartrate 25 mg tablet Commonly known as:  LOPRESSOR Take 3 Tabs by mouth two (2) times a day. pravastatin 40 mg tablet Commonly known as:  PRAVACHOL Take 1 Tab by mouth nightly. warfarin 2.5 mg tablet Commonly known as:  COUMADIN Take 1 Tab by mouth daily. We Performed the Following Monty 68 [[id number] [facility]] Follow-up Instructions Return in about 6 months (around 4/16/2019) for Follow up hypertension, Follow up weight management, Follow up hyperlipidemia. Patient Instructions Low-Fat Diet for Gallbladder Disease: Care Instructions Your Care Instructions When you eat, the gallbladder releases bile, which helps you digest the fat in food. If you have an inflamed gallbladder, this may cause pain. A low-fat diet may give your gallbladder a rest so you can start to heal. Your doctor and dietitian can help you make an eating plan that does not irritate your digestive system. Always talk with your doctor or dietitian before you make changes in your diet. Follow-up care is a key part of your treatment and safety. Be sure to make and go to all appointments, and call your doctor if you are having problems. It's also a good idea to know your test results and keep a list of the medicines you take. How can you care for yourself at home? · Eat many small meals and snacks each day instead of three large meals. · Choose lean meats. ¨ Eat no more than 5 to 6½ ounces of meat a day. ¨ Cut off all fat you can see. ¨ Eat chicken and turkey without the skin. ¨ Many types of fish, such as salmon, lake trout, tuna, and herring, provide healthy omega-3 fat. But, avoid fish canned in oil, such as sardines in olive oil. ¨ Bake, broil, or grill meats, poultry, or fish instead of frying them in butter or fat. · Drink or eat nonfat or low-fat milk, yogurt, cheese, or other milk products each day. ¨ Read the labels on cheeses, and choose those with less than 5 grams of fat an ounce. ¨ Try fat-free sour cream, cream cheese, or yogurt. ¨ Avoid cream soups and cream sauces on pasta. ¨ Eat low-fat ice cream, frozen yogurt, or sorbet. Avoid regular ice cream. 
· Eat whole-grain cereals, breads, crackers, rice, or pasta. Avoid high-fat foods such as croissants, scones, biscuits, waffles, doughnuts, muffins, granola, and high-fat breads. · Flavor your foods with herbs and spices (such as basil, tarragon, or mint), fat-free sauces, or lemon juice instead of butter. You can also use butter substitutes, fat-free mayonnaise, or fat-free dressing. · Try applesauce, prune puree, or mashed bananas to replace some or all of the fat when you bake. · Limit fats and oils, such as butter, margarine, mayonnaise, and salad dressing, to no more than 1 tablespoon a meal. 
· Avoid high-fat foods, such as: 
¨ Chocolate, whole milk, ice cream, and processed cheese. ¨ Fried or buttered foods. ¨ Sausage, salami, and smalls. ¨ Cinnamon rolls, cakes, pies, cookies, and other pastries. ¨ Prepared snack foods, such as potato chips, nut and granola bars, and mixed nuts. ¨ Coconut and avocado. · Learn how to read food labels for serving sizes and ingredients. Fast-food and convenience-food meals often have lots of fat. Where can you learn more? Go to http://yaritza-lucy.info/. Enter F217 in the search box to learn more about \"Low-Fat Diet for Gallbladder Disease: Care Instructions. \" Current as of: March 29, 2018 Content Version: 11.8 © 1871-0339 Healthwise, [employer]. Care instructions adapted under license by [employer] (which disclaims liability or warranty for this information). If you have questions about a medical condition or this instruction, always ask your healthcare professional. [place] [address] any warranty or liability for your use of this information. Introducing [facility]! [facility] introduces [employer] patient portal. Now you can access parts of your medical record, email your doctor's office, and request medication refills online. 1. In your internet browser, go to [email or web address] 2. Click on the First Time User? Click Here link in the Sign In box. You will see the New Member Sign Up page. 3. Enter your [employer] Access Code exactly as it appears below. You will not need to use this code after youve completed the sign-up process. If you do not sign up before the expiration date, you must request a new code. · [employer] Access Code: [email or web address] Expires: 1/14/2019  9:41 AM 
 
4. Enter the last four digits of your Social Security Number (xxxx) and Date of Birth (mm/dd/yyyy) as indicated and click Submit. You will be taken to the next sign-up page. 5. Create a [employer] ID. This will be your [employer] login ID and cannot be changed, so think of one that is secure and easy to remember. 6. Create a [employer] password. You can change your password at any time. 7. Enter your Password Reset Question and Answer. This can be used at a later time if you forget your password. 8. Enter your e-mail address. You will receive e-mail notification when new information is available in [address] 9. Click Sign Up. You can now view and download portions of your medical record. 10. Click the Download Summary menu link to download a portable copy of your medical information. If you have questions, please visit the Frequently Asked Questions section of the [employer] website. Remember, [employer] is NOT to be used for urgent needs. For medical emergencies, dial 911. Now available from your iPhone and Android! Please provide this summary of care documentation to your next provider. Your primary care clinician is listed as Breana Khan. If you have any questions after today's visit, please call [phone].

## 2018-10-16 NOTE — PATIENT INSTRUCTIONS
Low-Fat Diet for Gallbladder Disease: Care Instructions Your Care Instructions When you eat, the gallbladder releases bile, which helps you digest the fat in food. If you have an inflamed gallbladder, this may cause pain. A low-fat diet may give your gallbladder a rest so you can start to heal. Your doctor and dietitian can help you make an eating plan that does not irritate your digestive system. Always talk with your doctor or dietitian before you make changes in your diet. Follow-up care is a key part of your treatment and safety. Be sure to make and go to all appointments, and call your doctor if you are having problems. It's also a good idea to know your test results and keep a list of the medicines you take. How can you care for yourself at home? · Eat many small meals and snacks each day instead of three large meals. · Choose lean meats. ¨ Eat no more than 5 to 6½ ounces of meat a day. ¨ Cut off all fat you can see. ¨ Eat chicken and turkey without the skin. ¨ Many types of fish, such as salmon, lake trout, tuna, and herring, provide healthy omega-3 fat. But, avoid fish canned in oil, such as sardines in olive oil. ¨ Bake, broil, or grill meats, poultry, or fish instead of frying them in butter or fat. · Drink or eat nonfat or low-fat milk, yogurt, cheese, or other milk products each day. ¨ Read the labels on cheeses, and choose those with less than 5 grams of fat an ounce. ¨ Try fat-free sour cream, cream cheese, or yogurt. ¨ Avoid cream soups and cream sauces on pasta. ¨ Eat low-fat ice cream, frozen yogurt, or sorbet. Avoid regular ice cream. 
· Eat whole-grain cereals, breads, crackers, rice, or pasta. Avoid high-fat foods such as croissants, scones, biscuits, waffles, doughnuts, muffins, granola, and high-fat breads. · Flavor your foods with herbs and spices (such as basil, tarragon, or mint), fat-free sauces, or lemon juice instead of butter.  You can also use butter substitutes, fat-free mayonnaise, or fat-free dressing. · Try applesauce, prune puree, or mashed bananas to replace some or all of the fat when you bake. · Limit fats and oils, such as butter, margarine, mayonnaise, and salad dressing, to no more than 1 tablespoon a meal. 
· Avoid high-fat foods, such as: 
¨ Chocolate, whole milk, ice cream, and processed cheese. ¨ Fried or buttered foods. ¨ Sausage, salami, and smalls. ¨ Cinnamon rolls, cakes, pies, cookies, and other pastries. ¨ Prepared snack foods, such as potato chips, nut and granola bars, and mixed nuts. ¨ Coconut and avocado. · Learn how to read food labels for serving sizes and ingredients. Fast-food and convenience-food meals often have lots of fat. Where can you learn more? Go to http://yaritza-lucy.info/. Enter F102 in the search box to learn more about \"Low-Fat Diet for Gallbladder Disease: Care Instructions. \" Current as of: March 29, 2018 Content Version: 11.8 © 7059-7651 Healthwise, [employer]. Care instructions adapted under license by [employer] (which disclaims liability or warranty for this information). If you have questions about a medical condition or this instruction, always ask your healthcare professional. [place] [address] any warranty or liability for your use of this information.

## 2018-10-16 NOTE — PROGRESS NOTES
Internal Medicine Progress Note Today's Date:  10/16/2018 Patient:  Rayne Ocampo Patient :  1950 Subjective: Chief Complaint Patient presents with  Hypertension  Cholesterol Problem  Weight Management  Back Pain Hypertension This is a chronic problem. BP is at goal. Pt takes lopressor, norvasc and candesartan. Pt reports compliance with these medications. Hyperlipidemia This is a chronic problem. TG is not at goal. Last FLP was checked on 2018. Pt takes pravastatin. Atrial fibrillation This is a chronic problem. This is at goal. Pt is on coumadin and a beta blocker. Pt was taken off amiodarone. Pt is seeing Dr. Jimenez Cantu and Dr. Alvarado Melendez. Pt is s/p cardiac ablation and cardioversion. Obesity Class III This is a chronic problem. This is not at goal. Pt does not exercise regularly due to left knee pain. Both knees hurt but the left one is worse. Pt is on the ketogenic/IF diet. She gained weight since the last visit. Past Medical History:  
Diagnosis Date  Acute bilateral low back pain without sciatica 2018  Advance directive discussed with patient 2016 Pt would like to appoint her son, Ginna Brock as her medical decision maker in the event that she can no longer do so. Phone number is [phone]. Her secondary person is her niece, Roseanne Price. Phone number is [phone]. If her death is imminent and medical treatment will not help her recover, pt does not want life prolonging measures. If her condition makes her unaware of h  
 Dizziness 5/3/2016  Hypertriglyceridemia 2016  Obesity, Class III, BMI 40-49.9 (morbid obesity) ([facility] [address] 2015  Osteoarthritis  Shingles 2015 History reviewed. No pertinent surgical history. reports that she has never smoked. She has never used smokeless tobacco. She reports that she does not drink alcohol or use illicit drugs. Family History Problem Relation Age of Onset  Heart Disease Father   
  unknown, ? MI at age 46  Arthritis-osteo Sister No Known Allergies Review of Systems Positives in bold CV:      chest pain, palpitations PULM:  SOB, wheezing, cough, sputum production Current Outpatient Meds and Allergies Current Outpatient Prescriptions on File Prior to Visit Medication Sig Dispense Refill  pravastatin (PRAVACHOL) 40 mg tablet Take 1 Tab by mouth nightly. 90 Tab 3  
 acetaminophen (TYLENOL) 650 mg TbER Take 2 Tabs by mouth every eight (8) hours. 90 Tab 3  
 amLODIPine (NORVASC) 5 mg tablet Take 1 Tab by mouth daily. 90 Tab 3  warfarin (COUMADIN) 2.5 mg tablet Take 1 Tab by mouth daily. 90 Tab 3  
 candesartan (ATACAND) 32 mg tablet Take 1 Tab by mouth daily. 90 Tab 3  
 metoprolol tartrate (LOPRESSOR) 25 mg tablet Take 3 Tabs by mouth two (2) times a day. 540 Tab 3 No current facility-administered medications on file prior to visit. No Known Allergies Objective:    
VS:   
Visit Vitals  /84 (BP 1 Location: Right arm, BP Patient Position: Sitting)  Pulse 69  Temp 98.3 °F (36.8 °C) (Oral)  Resp 18  Ht 5' 1\" (1.549 m)  Wt 257 lb 3.2 oz (116.7 kg)  SpO2 95%  BMI 48.6 kg/m2 General:   Well-nourished, well-groomed, pleasant, alert, in no acute distress Head:  Normocephalic, atraumatic Ears:  External ears WNL Nose:  External nares WNL Psych:  No pressured speech, no abnormal thought content PHQ over the last two weeks 10/16/2018 PHQ Not Done - Little interest or pleasure in doing things Not at all Feeling down, depressed, irritable, or hopeless Not at all Total Score PHQ 2 0 No visits with results within 3 Month(s) from this visit. Latest known visit with results is: 
 
Office Visit on 06/14/2018 Component Date Value Ref Range Status  Mammography, External 06/29/2018 negative   Final  
 
Assessment/Plan & Orders: ICD-10-CM ICD-9-CM 1. Essential hypertension I10 401.9 2. Obesity, Class III, BMI 40-49.9 (morbid obesity) (HCC) E66.01 278.01   
3. Hypertriglyceridemia E78.1 272.1 4. Chronic atrial fibrillation (HCC) I48.2 427.31   
5. Screening for depression Z13.31 V79.0 Monty Daly Healthy lifestyle has been encouraged including avoidance of tobacco, limiting or avoiding alcohol intake, heart healthy diet which is low in cholesterol and saturated fat and contains fresh fruits, vegetables and whole grains and fiber, regular exercise with goals of 20-30 minutes 3-5 days weekly and maintaining an optimal BMI. Continue ketogenic/IF diet with exercise Follow up with cardiology Pt needs to schedule a colonoscopy or do a fit test 
If weight loss persists and bp is still at goal/low, consider stopping norvasc Depression screening: 10/16/18 Follow-up Disposition: 
Return in about 6 months (around 4/16/2019) for Follow up hypertension, Follow up weight management, Follow up hyperlipidemia. *Patient verbalized understanding and agreement with the plan. Patient was given an after-visit summary. Cande Sanchez. Nena Davila MD - Internal Medicine 10/16/2018, 9:38 AM 
[facility] [address] [facility], [address] Phone [phone] Fax [phone]

## 2019-01-31 ENCOUNTER — OFFICE VISIT (OUTPATIENT)
Dept: FAMILY MEDICINE CLINIC | Facility: CLINIC | Age: 69
End: 2019-01-31

## 2019-01-31 VITALS
HEART RATE: 73 BPM | RESPIRATION RATE: 18 BRPM | SYSTOLIC BLOOD PRESSURE: 150 MMHG | BODY MASS INDEX: 49.17 KG/M2 | WEIGHT: 260.4 LBS | TEMPERATURE: 98.1 F | DIASTOLIC BLOOD PRESSURE: 78 MMHG | OXYGEN SATURATION: 95 % | HEIGHT: 61 IN

## 2019-01-31 DIAGNOSIS — I48.20 CHRONIC ATRIAL FIBRILLATION (HCC): ICD-10-CM

## 2019-01-31 DIAGNOSIS — Z12.11 SCREEN FOR COLON CANCER: ICD-10-CM

## 2019-01-31 DIAGNOSIS — G89.29 CHRONIC MIDLINE LOW BACK PAIN WITHOUT SCIATICA: Primary | ICD-10-CM

## 2019-01-31 DIAGNOSIS — E66.01 OBESITY, CLASS III, BMI 40-49.9 (MORBID OBESITY) (HCC): ICD-10-CM

## 2019-01-31 DIAGNOSIS — M54.50 CHRONIC MIDLINE LOW BACK PAIN WITHOUT SCIATICA: Primary | ICD-10-CM

## 2019-01-31 RX ORDER — CYCLOBENZAPRINE HCL 10 MG
10 TABLET ORAL
Qty: 30 TAB | Refills: 0 | Status: SHIPPED | OUTPATIENT
Start: 2019-01-31

## 2019-01-31 RX ORDER — METHYLPREDNISOLONE 4 MG/1
TABLET ORAL
Qty: 1 DOSE PACK | Refills: 0 | Status: SHIPPED | OUTPATIENT
Start: 2019-01-31 | End: 2019-02-28

## 2019-01-31 RX ORDER — CYCLOBENZAPRINE HCL 10 MG
10 TABLET ORAL
Qty: 30 TAB | Refills: 0 | Status: SHIPPED | OUTPATIENT
Start: 2019-01-31 | End: 2019-01-31 | Stop reason: SDUPTHER

## 2019-01-31 NOTE — PROGRESS NOTES
Johanne Looney is 70 y.o. female presents today for office visit for follow up for hypertension. Pt is fasting. Pt is in Room# 3.    1. Have you been to the ER, urgent care clinic since your last visit? Hospitalized since your last visit? no  2. Have you seen or consulted any other health care providers outside of the [address] [place] [address] since your last visit? Include any pap smears or colon screening. no    Health Maintenance reviewed.        Upcoming Appts  3/2019- cardiology        Requested Prescriptions      No prescriptions requested or ordered in this encounter       Visit Vitals  /78 (BP 1 Location: Left arm, BP Patient Position: Sitting) Comment (BP Patient Position): manual   Pulse 73   Temp 98.1 °F (36.7 °C) (Oral)   Resp 18   Ht 5' 1\" (1.549 m)   Wt 260 lb 6.4 oz (118.1 kg)   SpO2 95%   BMI 49.20 kg/m²

## 2019-01-31 NOTE — PATIENT INSTRUCTIONS
Back Pain: Care Instructions  Your Care Instructions    Back pain has many possible causes. It is often related to problems with muscles and ligaments of the back. It may also be related to problems with the nerves, discs, or bones of the back. Moving, lifting, standing, sitting, or sleeping in an awkward way can strain the back. Sometimes you don't notice the injury until later. Arthritis is another common cause of back pain. Although it may hurt a lot, back pain usually improves on its own within several weeks. Most people recover in 12 weeks or less. Using good home treatment and being careful not to stress your back can help you feel better sooner. Follow-up care is a key part of your treatment and safety. Be sure to make and go to all appointments, and call your doctor if you are having problems. It's also a good idea to know your test results and keep a list of the medicines you take. How can you care for yourself at home? · Sit or lie in positions that are most comfortable and reduce your pain. Try one of these positions when you lie down:  ? Lie on your back with your knees bent and supported by large pillows. ? Lie on the floor with your legs on the seat of a sofa or chair. ? Lie on your side with your knees and hips bent and a pillow between your legs. ? Lie on your stomach if it does not make pain worse. · Do not sit up in bed, and avoid soft couches and twisted positions. Bed rest can help relieve pain at first, but it delays healing. Avoid bed rest after the first day of back pain. · Change positions every 30 minutes. If you must sit for long periods of time, take breaks from sitting. Get up and walk around, or lie in a comfortable position. · Try using a heating pad on a low or medium setting for 15 to 20 minutes every 2 or 3 hours. Try a warm shower in place of one session with the heating pad. · You can also try an ice pack for 10 to 15 minutes every 2 to 3 hours.  Put a thin cloth between the ice pack and your skin. · Take pain medicines exactly as directed. ? If the doctor gave you a prescription medicine for pain, take it as prescribed. ? If you are not taking a prescription pain medicine, ask your doctor if you can take an over-the-counter medicine. · Take short walks several times a day. You can start with 5 to 10 minutes, 3 or 4 times a day, and work up to longer walks. Walk on level surfaces and avoid hills and stairs until your back is better. · Return to work and other activities as soon as you can. Continued rest without activity is usually not good for your back. · To prevent future back pain, do exercises to stretch and strengthen your back and stomach. Learn how to use good posture, safe lifting techniques, and proper body mechanics. When should you call for help? Call your doctor now or seek immediate medical care if:    · You have new or worsening numbness in your legs.     · You have new or worsening weakness in your legs. (This could make it hard to stand up.)     · You lose control of your bladder or bowels.    Watch closely for changes in your health, and be sure to contact your doctor if:    · You have a fever, lose weight, or don't feel well.     · You do not get better as expected. Where can you learn more? Go to http://yaritza-lucy.info/. Enter P545 in the search box to learn more about \"Back Pain: Care Instructions. \"  Current as of: September 20, 2018  Content Version: 11.9  © 2620-1790 [employer]. Care instructions adapted under license by [employer] (which disclaims liability or warranty for this information). If you have questions about a medical condition or this instruction, always ask your healthcare professional. [place] [address] any warranty or liability for your use of this information.

## 2019-01-31 NOTE — PROGRESS NOTES
Internal Medicine Progress Note    Today's Date:  2019   Patient:  Darryl Francisco  Patient :  1950    Subjective:     Chief Complaint   Patient presents with    Hypertension    Obesity    LOW BACK PAIN      Hypertension   This is a chronic problem. BP is at goal. Pt takes lopressor, norvasc and candesartan. Pt reports compliance with these medications. Atrial fibrillation  This is a chronic problem. This is at goal. Pt is on coumadin and a beta blocker. Pt was taken off amiodarone. Pt is seeing Dr. Suma Parry and Dr. Balbir Sequeira. Pt is s/p cardiac ablation and cardioversion. Obesity Class III   This is a chronic problem. This is not at goal. Pt does not exercise regularly due to left knee pain. Both knees hurt but the left one is worse. Pt is on the ketogenic/IF diet. She gained weight since the last visit. Back pain  This is a new problem. This is not controlled. This was located on her lower back. Pain does not radiate down her legs. Pt takes no medication for this. Depression screening: 10/16/18    Past Medical History:   Diagnosis Date    Acute bilateral low back pain without sciatica 2018    Advance directive discussed with patient 2016    Pt would like to appoint her son, Tejas Shaw as her medical decision maker in the event that she can no longer do so. Phone number is [phone]. Her secondary person is her niece, Gabriel Mendoza. Phone number is [phone]. If her death is imminent and medical treatment will not help her recover, pt does not want life prolonging measures. If her condition makes her unaware of h    Dizziness 5/3/2016    Hypertriglyceridemia 2016    Obesity, Class III, BMI 40-49.9 (morbid obesity) ([facility] [address] 2015    Osteoarthritis     Shingles 2015     History reviewed. No pertinent surgical history. reports that  has never smoked.  she has never used smokeless tobacco. She reports that she does not drink alcohol or use drugs. Family History   Problem Relation Age of Onset    Heart Disease Father         unknown, ? MI at age 46    Arthritis-osteo Sister      No Known Allergies     Review of Systems   Positives in bold  CV:      chest pain, palpitations  PULM:  SOB, wheezing, cough, sputum production    Current Outpatient Meds and Allergies     Current Outpatient Medications on File Prior to Visit   Medication Sig Dispense Refill    pravastatin (PRAVACHOL) 40 mg tablet Take 1 Tab by mouth nightly. 90 Tab 3    acetaminophen (TYLENOL) 650 mg TbER Take 2 Tabs by mouth every eight (8) hours. 90 Tab 3    amLODIPine (NORVASC) 5 mg tablet Take 1 Tab by mouth daily. 90 Tab 3    warfarin (COUMADIN) 2.5 mg tablet Take 1 Tab by mouth daily. 90 Tab 3    candesartan (ATACAND) 32 mg tablet Take 1 Tab by mouth daily. 90 Tab 3    metoprolol tartrate (LOPRESSOR) 25 mg tablet Take 3 Tabs by mouth two (2) times a day. 540 Tab 3     No current facility-administered medications on file prior to visit. No Known Allergies  Objective:     VS:    Visit Vitals  /78 (BP 1 Location: Left arm, BP Patient Position: Sitting) Comment (BP Patient Position): manual   Pulse 73   Temp 98.1 °F (36.7 °C) (Oral)   Resp 18   Ht 5' 1\" (1.549 m)   Wt 260 lb 6.4 oz (118.1 kg)   SpO2 95%   BMI 49.20 kg/m²     General:   Well-nourished, well-groomed, pleasant, alert, in no acute distress  Head:  Normocephalic, atraumatic  Ears:  External ears WNL  Nose:  External nares WNL  Psych:  No pressured speech, no abnormal thought content  Back:  No CVA tenderness, pt unable to get on the examining table    PHQ over the last two weeks 10/16/2018   PHQ Not Done -   Little interest or pleasure in doing things Not at all   Feeling down, depressed, irritable, or hopeless Not at all   Total Score PHQ 2 0     No visits with results within 3 Month(s) from this visit.    Latest known visit with results is:   Office Visit on 06/14/2018   Component Date Value Ref Range Status    Mammography, External 06/29/2018 negative   Final     Assessment/Plan & Orders:         ICD-10-CM ICD-9-CM    1. Chronic midline low back pain without sciatica M54.5 724.2 methylPREDNISolone (MEDROL DOSEPACK) 4 mg tablet    G89.29 338.29 cyclobenzaprine (FLEXERIL) 10 mg tablet   2. Chronic atrial fibrillation (HCC) I48.2 427.31    3. Obesity, Class III, BMI 40-49.9 (morbid obesity) (HCC) E66.01 278.01    4. Screen for colon cancer Z12.11 V76.51 OCCULT BLOOD IMMUNOASSAY,DIAGNOSTIC       Continue ketogenic/IF diet with exercise  Follow up with cardiology  Pt to check BP at home. If BP is >150/90, can increase norvasc to 10 mg po daily    Follow-up Disposition:  Return in about 4 weeks (around 2/28/2019) for Weight management, Hypertension, Pain. *Patient verbalized understanding and agreement with the plan. Patient was given an after-visit summary. Gris Camacho.  Darren Briggs MD - Internal Medicine  2/1/2019, 9:38 AM  [facility]  [address] [place], [address]  Phone [phone]  Fax [phone]

## 2019-02-28 ENCOUNTER — OFFICE VISIT (OUTPATIENT)
Dept: FAMILY MEDICINE CLINIC | Facility: CLINIC | Age: 69
End: 2019-02-28

## 2019-02-28 VITALS
BODY MASS INDEX: 48.9 KG/M2 | SYSTOLIC BLOOD PRESSURE: 140 MMHG | RESPIRATION RATE: 17 BRPM | OXYGEN SATURATION: 98 % | TEMPERATURE: 98 F | WEIGHT: 259 LBS | DIASTOLIC BLOOD PRESSURE: 86 MMHG | HEART RATE: 69 BPM | HEIGHT: 61 IN

## 2019-02-28 DIAGNOSIS — I10 ESSENTIAL HYPERTENSION: Primary | ICD-10-CM

## 2019-02-28 DIAGNOSIS — I48.20 CHRONIC ATRIAL FIBRILLATION (HCC): ICD-10-CM

## 2019-02-28 DIAGNOSIS — E78.5 HYPERLIPIDEMIA, UNSPECIFIED HYPERLIPIDEMIA TYPE: ICD-10-CM

## 2019-02-28 RX ORDER — DEXTROMETHORPHAN HYDROBROMIDE, GUAIFENESIN 5; 100 MG/5ML; MG/5ML
1300 LIQUID ORAL EVERY 8 HOURS
Qty: 90 TAB | Refills: 3 | Status: SHIPPED | OUTPATIENT
Start: 2019-02-28

## 2019-02-28 RX ORDER — WARFARIN 2.5 MG/1
2.5 TABLET ORAL DAILY
Qty: 90 TAB | Refills: 3 | Status: SHIPPED | OUTPATIENT
Start: 2019-02-28

## 2019-02-28 RX ORDER — CANDESARTAN 32 MG/1
32 TABLET ORAL DAILY
Qty: 90 TAB | Refills: 3 | Status: SHIPPED | OUTPATIENT
Start: 2019-02-28 | End: 2020-09-08

## 2019-02-28 RX ORDER — METOPROLOL TARTRATE 25 MG/1
75 TABLET, FILM COATED ORAL 2 TIMES DAILY
Qty: 540 TAB | Refills: 3 | Status: SHIPPED | OUTPATIENT
Start: 2019-02-28 | End: 2019-05-28

## 2019-02-28 RX ORDER — PRAVASTATIN SODIUM 40 MG/1
40 TABLET ORAL
Qty: 90 TAB | Refills: 3 | Status: SHIPPED | OUTPATIENT
Start: 2019-02-28 | End: 2020-11-06 | Stop reason: SDUPTHER

## 2019-02-28 RX ORDER — AMLODIPINE BESYLATE 5 MG/1
5 TABLET ORAL DAILY
Qty: 90 TAB | Refills: 3 | Status: SHIPPED | OUTPATIENT
Start: 2019-02-28 | End: 2020-09-08

## 2019-02-28 NOTE — PROGRESS NOTES
Internal Medicine Progress Note Today's Date:  2019 Patient:  Barbara Parish Patient :  1950 Subjective: Chief Complaint Patient presents with  LOW BACK PAIN  
 Hypertension  Weight Management Hypertension This is a chronic problem. BP is at goal. Pt takes lopressor, norvasc and candesartan. Pt reports compliance with these medications. Atrial fibrillation This is a chronic problem. This is at goal. Pt is on coumadin and a beta blocker. Pt was taken off amiodarone. Pt is seeing Dr. Florinda Amaro and Dr. Susanne Lerma. Pt is s/p cardiac ablation and cardioversion. Obesity Class III This is a chronic problem. This is not at goal. Pt does not exercise regularly due to left knee pain. Both knees hurt but the left one is worse. Pt is on the ketogenic/IF diet. She lost weight since the last visit. Depression screening: 10/16/18 Past Medical History:  
Diagnosis Date  Acute bilateral low back pain without sciatica 2018  Advance directive discussed with patient 2016 Pt would like to appoint her son, Fior Best as her medical decision maker in the event that she can no longer do so. Phone number is [phone]. Her secondary person is her niece, Cassie Hardwick. Phone number is [phone]. If her death is imminent and medical treatment will not help her recover, pt does not want life prolonging measures. If her condition makes her unaware of h  
 Dizziness 5/3/2016  Hypertriglyceridemia 2016  Obesity, Class III, BMI 40-49.9 (morbid obesity) ([address] 2015  Osteoarthritis  Shingles 2015 History reviewed. No pertinent surgical history. reports that  has never smoked. she has never used smokeless tobacco. She reports that she does not drink alcohol or use drugs. Family History Problem Relation Age of Onset  Heart Disease Father   
     unknown, ? MI at age 46  Arthritis-osteo Sister No Known Allergies Review of Systems Positives in bold CV:      chest pain, palpitations PULM:  SOB, wheezing, cough, sputum production Current Outpatient Meds and Allergies Current Outpatient Medications on File Prior to Visit Medication Sig Dispense Refill  cyclobenzaprine (FLEXERIL) 10 mg tablet Take 1 Tab by mouth three (3) times daily as needed for Muscle Spasm(s). 30 Tab 0 No current facility-administered medications on file prior to visit. No Known Allergies Objective:    
VS:   
Visit Vitals /86 (BP 1 Location: Right arm, BP Patient Position: Sitting) Pulse 69 Temp 98 °F (36.7 °C) (Oral) Resp 17 Ht 5' 1\" (1.549 m) Wt 259 lb (117.5 kg) SpO2 98% BMI 48.94 kg/m² General:   Well-nourished, well-groomed, pleasant, alert, in no acute distress Head:  Normocephalic, atraumatic Ears:  External ears WNL Nose:  External nares WNL Psych:  No pressured speech, no abnormal thought content 3 most recent PHQ Screens 10/16/2018 PHQ Not Done - Little interest or pleasure in doing things Not at all Feeling down, depressed, irritable, or hopeless Not at all Total Score PHQ 2 0 No visits with results within 3 Month(s) from this visit. Latest known visit with results is:  
Office Visit on 06/14/2018 Component Date Value Ref Range Status  Mammography, External 06/29/2018 negative   Final  
 
Assessment/Plan & Orders: ICD-10-CM ICD-9-CM 1. Essential hypertension I10 401.9 candesartan (ATACAND) 32 mg tablet  
   amLODIPine (NORVASC) 5 mg tablet 2. Hyperlipidemia, unspecified hyperlipidemia type E78.5 272.4 pravastatin (PRAVACHOL) 40 mg tablet 3. Chronic atrial fibrillation (HCC) I48.2 427.31 warfarin (COUMADIN) 2.5 mg tablet  
   metoprolol tartrate (LOPRESSOR) 25 mg tablet Continue ketogenic/IF diet with exercise Follow up with cardiology Pt needs fasting labs Follow-up Disposition: Return in about 3 months (around 5/28/2019) for Hypertension, Weight management, Hyperlipidemia. *Patient verbalized understanding and agreement with the plan. Patient was given an after-visit summary. Nasima Harding. Monserrat Dominguez MD - Internal Medicine 2/28/2019, 9:38 AM 
[facility] [address] [facility], [address] Phone [phone] Fax [phone]

## 2019-02-28 NOTE — PATIENT INSTRUCTIONS
High Blood Pressure: Care Instructions Overview It's normal for blood pressure to go up and down throughout the day. But if it stays up, you have high blood pressure. Another name for high blood pressure is hypertension. Despite what a lot of people think, high blood pressure usually doesn't cause headaches or make you feel dizzy or lightheaded. It usually has no symptoms. But it does increase your risk of stroke, heart attack, and other problems. You and your doctor will talk about your risks of these problems based on your blood pressure. Your doctor will give you a goal for your blood pressure. Your goal will be based on your health and your age. Lifestyle changes, such as eating healthy and being active, are always important to help lower blood pressure. You might also take medicine to reach your blood pressure goal. 
Follow-up care is a key part of your treatment and safety. Be sure to make and go to all appointments, and call your doctor if you are having problems. It's also a good idea to know your test results and keep a list of the medicines you take. How can you care for yourself at home? Medical treatment · If you stop taking your medicine, your blood pressure will go back up. You may take one or more types of medicine to lower your blood pressure. Be safe with medicines. Take your medicine exactly as prescribed. Call your doctor if you think you are having a problem with your medicine. · Talk to your doctor before you start taking aspirin every day. Aspirin can help certain people lower their risk of a heart attack or stroke. But taking aspirin isn't right for everyone, because it can cause serious bleeding. · See your doctor regularly. You may need to see the doctor more often at first or until your blood pressure comes down. · If you are taking blood pressure medicine, talk to your doctor before you take decongestants or anti-inflammatory medicine, such as ibuprofen. Some of these medicines can raise blood pressure. · Learn how to check your blood pressure at home. Lifestyle changes · Stay at a healthy weight. This is especially important if you put on weight around the waist. Losing even 10 pounds can help you lower your blood pressure. · If your doctor recommends it, get more exercise. Walking is a good choice. Bit by bit, increase the amount you walk every day. Try for at least 30 minutes on most days of the week. You also may want to swim, bike, or do other activities. · Avoid or limit alcohol. Talk to your doctor about whether you can drink any alcohol. · Try to limit how much sodium you eat to less than 2,300 milligrams (mg) a day. Your doctor may ask you to try to eat less than 1,500 mg a day. · Eat plenty of fruits (such as bananas and oranges), vegetables, legumes, whole grains, and low-fat dairy products. · Lower the amount of saturated fat in your diet. Saturated fat is found in animal products such as milk, cheese, and meat. Limiting these foods may help you lose weight and also lower your risk for heart disease. · Do not smoke. Smoking increases your risk for heart attack and stroke. If you need help quitting, talk to your doctor about stop-smoking programs and medicines. These can increase your chances of quitting for good. When should you call for help? Call 911 anytime you think you may need emergency care. This may mean having symptoms that suggest that your blood pressure is causing a serious heart or blood vessel problem. Your blood pressure may be over 180/120. 
 For example, call 911 if: 
  · You have symptoms of a heart attack. These may include: 
? Chest pain or pressure, or a strange feeling in the chest. 
? Sweating. ? Shortness of breath. ? Nausea or vomiting. ? Pain, pressure, or a strange feeling in the back, neck, jaw, or upper belly or in one or both shoulders or arms. ? Lightheadedness or sudden weakness. ? A fast or irregular heartbeat.  
  · You have symptoms of a stroke. These may include: 
? Sudden numbness, tingling, weakness, or loss of movement in your face, arm, or leg, especially on only one side of your body. ? Sudden vision changes. ? Sudden trouble speaking. ? Sudden confusion or trouble understanding simple statements. ? Sudden problems with walking or balance. ? A sudden, severe headache that is different from past headaches.  
  · You have severe back or belly pain.  
 Do not wait until your blood pressure comes down on its own. Get help right away. 
 Call your doctor now or seek immediate care if: 
  · Your blood pressure is much higher than normal (such as 180/120 or higher), but you don't have symptoms.  
  · You think high blood pressure is causing symptoms, such as: 
? Severe headache. 
? Blurry vision.  
 Watch closely for changes in your health, and be sure to contact your doctor if: 
  · Your blood pressure measures higher than your doctor recommends at least 2 times. That means the top number is higher or the bottom number is higher, or both.  
  · You think you may be having side effects from your blood pressure medicine. Where can you learn more? Go to http://yaritza-lucy.info/. Enter K217 in the search box to learn more about \"High Blood Pressure: Care Instructions. \" Current as of: July 22, 2018 Content Version: 11.9 © 8868-9260 [employer]. Care instructions adapted under license by [employer] (which disclaims liability or warranty for this information). If you have questions about a medical condition or this instruction, always ask your healthcare professional. [place] [address] any warranty or liability for your use of this information.

## 2019-02-28 NOTE — PROGRESS NOTES
Jessika Huff is a 78 y.o.  female presents today for office visit for follow up. Pt would also like to discuss HTN,Wt man,etc. Pt is not fasting. Pt is in Room # 3 1. Have you been to the ER, urgent care clinic since your last visit? Hospitalized since your last visit? No 
 
2. Have you seen or consulted any other health care providers outside of the [address] [place] [address] since your last visit? Include any pap smears or colon screening. No 
 
Upcoming Appts 
none Health Maintenance reviewed colonoscopy:Fit test ordered kit given Shingrix: VORB: No orders of the defined types were placed in this encounter.  
Ricci Mccann, TUCKERN

## 2019-03-04 ENCOUNTER — HOSPITAL ENCOUNTER (OUTPATIENT)
Dept: LAB | Age: 69
Discharge: HOME | End: 2019-03-04
Payer: MEDICARE

## 2019-03-04 DIAGNOSIS — Z12.11 SCREEN FOR COLON CANCER: ICD-10-CM

## 2019-03-04 PROCEDURE — 82274 ASSAY TEST FOR BLOOD FECAL: CPT

## 2019-03-07 LAB — HEMOCCULT STL QL IA: NEGATIVE

## 2019-03-21 ENCOUNTER — PATIENT OUTREACH (OUTPATIENT)
Dept: FAMILY MEDICINE CLINIC | Facility: CLINIC | Age: 69
End: 2019-03-21

## 2019-03-21 NOTE — PROGRESS NOTES
NN health screenings:    Negative for malignancy mammogram and fit test for CRC screening updated in HM. Closed this episode of care.

## 2019-05-28 ENCOUNTER — OFFICE VISIT (OUTPATIENT)
Dept: FAMILY MEDICINE CLINIC | Facility: CLINIC | Age: 69
End: 2019-05-28

## 2019-05-28 ENCOUNTER — HOSPITAL ENCOUNTER (OUTPATIENT)
Dept: LAB | Age: 69
Discharge: HOME | End: 2019-05-28
Payer: MEDICARE

## 2019-05-28 VITALS
OXYGEN SATURATION: 98 % | BODY MASS INDEX: 49.39 KG/M2 | SYSTOLIC BLOOD PRESSURE: 139 MMHG | DIASTOLIC BLOOD PRESSURE: 78 MMHG | TEMPERATURE: 97.1 F | RESPIRATION RATE: 16 BRPM | WEIGHT: 261.6 LBS | HEART RATE: 71 BPM | HEIGHT: 61 IN

## 2019-05-28 DIAGNOSIS — R73.9 ELEVATED BLOOD SUGAR: ICD-10-CM

## 2019-05-28 DIAGNOSIS — I10 ESSENTIAL HYPERTENSION: Primary | ICD-10-CM

## 2019-05-28 DIAGNOSIS — E78.1 HYPERTRIGLYCERIDEMIA: ICD-10-CM

## 2019-05-28 DIAGNOSIS — I10 ESSENTIAL HYPERTENSION: ICD-10-CM

## 2019-05-28 DIAGNOSIS — E66.01 OBESITY, CLASS III, BMI 40-49.9 (MORBID OBESITY) (HCC): ICD-10-CM

## 2019-05-28 DIAGNOSIS — Z13.31 SCREENING FOR DEPRESSION: ICD-10-CM

## 2019-05-28 LAB
ANION GAP SERPL CALC-SCNC: 7 MMOL/L (ref 3–18)
BUN SERPL-MCNC: 14 MG/DL (ref 7–18)
BUN/CREAT SERPL: 22 (ref 12–20)
CALCIUM SERPL-MCNC: 8.6 MG/DL (ref 8.5–10.1)
CHLORIDE SERPL-SCNC: 105 MMOL/L (ref 100–108)
CHOLEST SERPL-MCNC: 211 MG/DL
CO2 SERPL-SCNC: 28 MMOL/L (ref 21–32)
CREAT SERPL-MCNC: 0.65 MG/DL (ref 0.6–1.3)
GLUCOSE SERPL-MCNC: 88 MG/DL (ref 74–99)
HDLC SERPL-MCNC: 46 MG/DL (ref 40–60)
HDLC SERPL: 4.6 {RATIO} (ref 0–5)
LDLC SERPL CALC-MCNC: 120 MG/DL (ref 0–100)
LIPID PROFILE,FLP: ABNORMAL
POTASSIUM SERPL-SCNC: 4.2 MMOL/L (ref 3.5–5.5)
SODIUM SERPL-SCNC: 140 MMOL/L (ref 136–145)
TRIGL SERPL-MCNC: 225 MG/DL (ref ?–150)
VLDLC SERPL CALC-MCNC: 45 MG/DL

## 2019-05-28 PROCEDURE — 80061 LIPID PANEL: CPT

## 2019-05-28 PROCEDURE — 83036 HEMOGLOBIN GLYCOSYLATED A1C: CPT

## 2019-05-28 PROCEDURE — 36415 COLL VENOUS BLD VENIPUNCTURE: CPT

## 2019-05-28 PROCEDURE — 80048 BASIC METABOLIC PNL TOTAL CA: CPT

## 2019-05-28 RX ORDER — METOPROLOL TARTRATE 100 MG/1
TABLET ORAL 2 TIMES DAILY
COMMUNITY
End: 2020-11-06 | Stop reason: DRUGHIGH

## 2019-05-28 NOTE — PROGRESS NOTES
1. Have you been to the ER, urgent care clinic since your last visit? Hospitalized since your last visit? No    2. Have you seen or consulted any other health care providers outside of the [address] [place] [address] since your last visit? Include any pap smears or colon screening.  Yes When: 4-2019 seen at [facility] young orthopedic      Chief Complaint   Patient presents with    Hypertension    Weight Management    Cholesterol Problem    Back Pain     2nd cortisone shot  lowe back      Visit Vitals  /78 (BP 1 Location: Left arm, BP Patient Position: At rest)   Pulse 71   Temp 97.1 °F (36.2 °C)   Resp 16   Ht 5' 1\" (1.549 m)   Wt 261 lb 9.6 oz (118.7 kg)   SpO2 98%   BMI 49.43 kg/m²

## 2019-05-28 NOTE — PROGRESS NOTES
Internal Medicine Progress Note    Today's Date:  2019   Patient:  Marisel Duran  Patient :  1950    Subjective:     Chief Complaint   Patient presents with    Hypertension    Weight Management    Cholesterol Problem    Back Pain     2nd cortisone shot  lowe back       Hypertension   This is a chronic problem. BP is at goal. Pt takes lopressor, norvasc and candesartan. Pt reports compliance with these medications. Atrial fibrillation  This is a chronic problem. This is at goal. Pt is on coumadin and a beta blocker. Pt was taken off amiodarone. Pt is seeing Dr. Taylor Patrick and Dr. Vicente Franz. Pt is s/p cardiac ablation and cardioversion. Obesity Class III   This is a chronic problem. This is not at goal. Pt was on the ketogenic/IF diet. She gainedweight since the last visit. 3 most recent PHQ Screens 2019   PHQ Not Done -   Little interest or pleasure in doing things Not at all   Feeling down, depressed, irritable, or hopeless Not at all   Total Score PHQ 2 0     Past Medical History:   Diagnosis Date    Acute bilateral low back pain without sciatica 2018    Advance directive discussed with patient 2016    Pt would like to appoint her son, Smita Palafox as her medical decision maker in the event that she can no longer do so. Phone number is [phone]. Her secondary person is her niece, Ariadna Waterman. Phone number is [phone]. If her death is imminent and medical treatment will not help her recover, pt does not want life prolonging measures. If her condition makes her unaware of h    Dizziness 5/3/2016    Hypertriglyceridemia 2016    Obesity, Class III, BMI 40-49.9 (morbid obesity) ([facility] [address] 2015    Osteoarthritis     Shingles 2015     History reviewed. No pertinent surgical history. reports that she has never smoked. She has never used smokeless tobacco. She reports that she does not drink alcohol or use drugs.   Family History Problem Relation Age of Onset    Heart Disease Father         unknown, ? MI at age 46    Arthritis-osteo Sister      No Known Allergies     Review of Systems   CV:      chest pain, palpitations  PULM:  SOB, wheezing, cough, sputum production    Current Outpatient Meds and Allergies     Current Outpatient Medications on File Prior to Visit   Medication Sig Dispense Refill    metoprolol tartrate (LOPRESSOR) 100 mg IR tablet Take  by mouth two (2) times a day.  warfarin (COUMADIN) 2.5 mg tablet Take 1 Tab by mouth daily. 90 Tab 3    acetaminophen (TYLENOL) 650 mg TbER Take 2 Tabs by mouth every eight (8) hours. 90 Tab 3    pravastatin (PRAVACHOL) 40 mg tablet Take 1 Tab by mouth nightly. 90 Tab 3    candesartan (ATACAND) 32 mg tablet Take 1 Tab by mouth daily. 90 Tab 3    amLODIPine (NORVASC) 5 mg tablet Take 1 Tab by mouth daily. 90 Tab 3    cyclobenzaprine (FLEXERIL) 10 mg tablet Take 1 Tab by mouth three (3) times daily as needed for Muscle Spasm(s). 30 Tab 0     No current facility-administered medications on file prior to visit. No Known Allergies     Objective:       Visit Vitals  /78 (BP 1 Location: Left arm, BP Patient Position: At rest)   Pulse 71   Temp 97.1 °F (36.2 °C)   Resp 16   Ht 5' 1\" (1.549 m)   Wt 261 lb 9.6 oz (118.7 kg)   SpO2 98%   BMI 49.43 kg/m²     General:   Well-nourished, well-groomed, pleasant, alert, in no acute distress  Head:  Normocephalic, atraumatic  Ears:  External ears WNL  Nose:  External nares WNL  Psych:  No pressured speech, no abnormal thought content    Hospital Outpatient Visit on 03/04/2019   Component Date Value Ref Range Status    Occult blood fecal, by IA 03/04/2019 NEGATIVE   NEGATIVE   Final    Comment: (NOTE)  Performed At: [address] [id number]  Elliot Tate MD [id number]       Assessment/Plan & Orders:         ICD-10-CM ICD-9-CM    1. Essential hypertension A53 149.7 METABOLIC PANEL, BASIC   2. Hypertriglyceridemia E78.1 272.1 LIPID PANEL   3. Obesity, Class III, BMI 40-49.9 (morbid obesity) ([facility]) E66.01 278.01    4. Elevated blood sugar R73.9 790.29 HEMOGLOBIN A1C WITH EAG   5. Screening for depression Z13.31 V79.0 80271 PVPower      Diet with exercise  Follow up with cardiology and orthopedics    Follow-up and Dispositions    · Return in about 3 months (around 8/28/2019) for Hypertension, Weight management, Go over lab/imaging results, Hyperlipidemia. *Patient verbalized understanding and agreement with the plan. Patient was given an after-visit summary. Javy Pete.  Jack Eubanks MD - Internal Medicine  5/28/2019, 9:38 AM  [facility]  [address], [address]  Phone [phone]  Fax [phone]

## 2019-05-29 LAB
EST. AVERAGE GLUCOSE BLD GHB EST-MCNC: 111 MG/DL
HBA1C MFR BLD: 5.5 % (ref 4.2–5.6)

## 2019-07-12 LAB — MAMMOGRAPHY, EXTERNAL: NEGATIVE

## 2019-07-17 NOTE — PROGRESS NOTES
Elevated cholesterol noted. LPN to call pt to verify that the pt was fasting for 12 hours prior to lab draw. Recommend lifestyle modifications and continue statin.

## 2019-08-19 ENCOUNTER — OFFICE VISIT (OUTPATIENT)
Dept: FAMILY MEDICINE CLINIC | Facility: CLINIC | Age: 69
End: 2019-08-19

## 2019-08-19 VITALS
RESPIRATION RATE: 16 BRPM | HEIGHT: 61 IN | DIASTOLIC BLOOD PRESSURE: 80 MMHG | BODY MASS INDEX: 48.52 KG/M2 | WEIGHT: 257 LBS | TEMPERATURE: 98 F | HEART RATE: 70 BPM | SYSTOLIC BLOOD PRESSURE: 130 MMHG | OXYGEN SATURATION: 98 %

## 2019-08-19 DIAGNOSIS — E55.9 VITAMIN D DEFICIENCY: ICD-10-CM

## 2019-08-19 DIAGNOSIS — R73.9 ELEVATED BLOOD SUGAR: ICD-10-CM

## 2019-08-19 DIAGNOSIS — E78.2 MIXED HYPERLIPIDEMIA: ICD-10-CM

## 2019-08-19 DIAGNOSIS — E66.01 OBESITY, CLASS III, BMI 40-49.9 (MORBID OBESITY) (HCC): ICD-10-CM

## 2019-08-19 DIAGNOSIS — I10 ESSENTIAL HYPERTENSION: ICD-10-CM

## 2019-08-19 DIAGNOSIS — Z00.00 MEDICARE ANNUAL WELLNESS VISIT, SUBSEQUENT: Primary | ICD-10-CM

## 2019-08-19 NOTE — PROGRESS NOTES
Carina Quevedo is a 78 y.o. female and presents for annual Medicare Wellness Visit. Problem List: Reviewed with patient and discussed risk factors. Patient Active Problem List   Diagnosis Code    Aortic valve stenosis I35.0    Atrial fibrillation ([facility] [address] I48.91    Hypertension I10    Obesity, Class III, BMI 40-49.9 (morbid obesity) ([facility] [address] E66.01    Osteoarthritis M19.90    Sick sinus syndrome ([facility] [address] I49.5    Mixed hyperlipidemia E78.2       Current medical providers:  Patient Care Team:  Jose Mcnulty MD as PCP - General (Internal Medicine)  Grey Olivo MD (Cardiology)  [address], Pauline White DO (Cardiothoracic Surgery)  Dariel Gonsalves, [address] (Optometry)  Tamiko Mcmahon DDS as Physician (Dental General Practice)    [address]: Reviewed with patient  No past surgical history on file. SH: Reviewed with patient  Social History     Tobacco Use    Smoking status: Never Smoker    Smokeless tobacco: Never Used   Substance Use Topics    Alcohol use: No    Drug use: No       FH: Reviewed with patient  Family History   Problem Relation Age of Onset    Heart Disease Father         unknown, ? MI at age 46    Arthritis-osteo Sister      Medications/Allergies: Reviewed with patient  Current Outpatient Medications on File Prior to Visit   Medication Sig Dispense Refill    metoprolol tartrate (LOPRESSOR) 100 mg IR tablet Take  by mouth two (2) times a day.  warfarin (COUMADIN) 2.5 mg tablet Take 1 Tab by mouth daily. 90 Tab 3    acetaminophen (TYLENOL) 650 mg TbER Take 2 Tabs by mouth every eight (8) hours. 90 Tab 3    pravastatin (PRAVACHOL) 40 mg tablet Take 1 Tab by mouth nightly. 90 Tab 3    candesartan (ATACAND) 32 mg tablet Take 1 Tab by mouth daily. 90 Tab 3    amLODIPine (NORVASC) 5 mg tablet Take 1 Tab by mouth daily. 90 Tab 3    cyclobenzaprine (FLEXERIL) 10 mg tablet Take 1 Tab by mouth three (3) times daily as needed for Muscle Spasm(s).  30 Tab 0     No current facility-administered medications on file prior to visit. No Known Allergies    Objective:  Visit Vitals  /80 (BP 1 Location: Left arm, BP Patient Position: Sitting)   Pulse 70   Temp 98 °F (36.7 °C) (Oral)   Resp 16   Ht 5' 1\" (1.549 m)   Wt 257 lb (116.6 kg)   SpO2 98%   BMI 48.56 kg/m²    Body mass index is 48.56 kg/m². Assessment of cognitive impairment: Alert and oriented x 3  Depression Screen:   3 most recent PHQ Screens 5/28/2019   PHQ Not Done -   Little interest or pleasure in doing things Not at all   Feeling down, depressed, irritable, or hopeless Not at all   Total Score PHQ 2 0     Fall Risk Assessment:    Fall Risk Assessment, last 12 mths 5/28/2019   Able to walk? (No Data)   Fall in past 12 months? -     Functional Ability:   Does the patient exhibit a steady gait? yes   How long did it take the patient to get up and walk from a sitting position? 3 seconds   Is the patient self reliant?  (ie can do own laundry, meals, household chores)  yes   Does the patient handle his/her own medications? yes   Does the patient handle his/her own money? yes   Is the patients home safe (ie good lighting, handrails on stairs and bath, etc.)? yes   Did you notice or did patient express any hearing difficulties? Yes.  B/l hearing loss. Has hearing aids   Did you notice of did patient express any vision difficulties?   no   Were distance and reading eye charts used? yes     Advance Care Planning:   Patient was offered the opportunity to discuss advance care planning:  yes     Does patient have an Advance Directive:  Yes.     If no, did you provide information on Caring Connections?  no     Plan:    Orders Placed This Encounter     MAMMOGRAPHY    METABOLIC PANEL, COMPREHENSIVE    LIPID PANEL    HEMOGLOBIN A1C WITH EAG    VITAMIN D, 25 HYDROXY     Health Maintenance   Topic Date Due    MEDICARE YEARLY EXAM  06/15/2019    Influenza Age 5 to Adult  08/01/2019    Shingrix Vaccine Age 50> (1 of 2) 09/02/2019 (Originally 2/15/1999)    FOBT Q 1 YEAR, 18+  03/04/2020    GLAUCOMA SCREENING Q2Y  06/14/2020    BREAST CANCER SCRN MAMMOGRAM  06/29/2020    DTaP/Tdap/Td series (2 - Td) 06/07/2026    Hepatitis C Screening  Completed    Bone Densitometry (Dexa) Screening  Completed    Pneumococcal 65+ years  Completed     *Patient verbalized understanding and agreement with the plan. A copy of the After Visit Summary with personalized health plan was given to the patient today. Follow-up and Dispositions    · Return in about 6 months (around 2/19/2020) for Hypertension, Weight management, Hyperlipidemia, Go over lab/imaging results. Rebecca Castillo.  [address], MD - Internal Medicine  8/19/2019, 9:14 AM  [facility]  [address], [address]  Phone [phone]  Fax [phone]

## 2019-08-19 NOTE — PATIENT INSTRUCTIONS
Schedule of Personalized Health Plan  (Provide Copy to Patient)  The best way to stay healthy is to live a healthy lifestyle. A healthy lifestyle includes regular exercise, eating a well-balanced diet, keeping a healthy weight and not smoking. Regular physical exams and screening tests are another important way to take care of yourself. Preventive exams provided by health care providers can find health problems early when treatment works best and can keep you from getting certain diseases or illnesses. Preventive services include exams, lab tests, screenings, shots, monitoring and information to help you take care of your own health. All people over 65 should have a pneumonia shot. Pneumonia shots are usually only needed once in a lifetime unless your doctor decides differently. All people over 65 should have a yearly flu shot. People over 65 are at medium to high risk for Hepatitis B. Three shots are needed for complete protection. In addition to your physical exam, some screening tests are recommended:    Bone mass measurement (dexa scan) is recommended every two years  Diabetes Mellitus screening is recommended every year. Glaucoma is an eye disease caused by high pressure in the eye. An eye exam is recommended every year. Cardiovascular screening tests that check your cholesterol and other blood fat (lipid) levels are recommended every five years. Colorectal Cancer screening tests help to find pre-cancerous polyps (growths in the colon) so they can be removed before they turn into cancer. Tests ordered for screening depend on your personal and family history risk factors.     Screening for Breast Cancer is recommended yearly with a mammogram.    Screening for Cervical Cancer is recommended every two years (annually for certain risk factors, such as previous history of STD or abnormal PAP in past 7 years), with a Pelvic Exam with PAP    Here is a list of your current Health Maintenance items with a due date:  Health Maintenance   Topic Date Due    MEDICARE YEARLY EXAM  06/15/2019    Influenza Age 5 to Adult  08/01/2019    Shingrix Vaccine Age 50> (1 of 2) 09/02/2019 (Originally 2/15/1999)    FOBT Q 1 YEAR, 18+  03/04/2020    GLAUCOMA SCREENING Q2Y  06/14/2020    BREAST CANCER SCRN MAMMOGRAM  06/29/2020    DTaP/Tdap/Td series (2 - Td) 06/07/2026    Hepatitis C Screening  Completed    Bone Densitometry (Dexa) Screening  Completed    Pneumococcal 65+ years  Completed

## 2019-08-19 NOTE — PROGRESS NOTES
Tristen Ritter is a 78 y.o.  female presents today for office visit for follow up. Pt would also like to discuss HTN  . Pt is not fasting. Pt is in Room # 2      1. Have you been to the ER, urgent care clinic since your last visit? Hospitalized since your last visit? No    2. Have you seen or consulted any other health care providers outside of the [address] [place] [address] since your last visit? Include any pap smears or colon screening. No    Upcoming Appts  none    Health Maintenance reviewed       VORB: No orders of the defined types were placed in this encounter.   Samanta Ortiz LPN      Visual Acuity Screening    Right eye Left eye Both eyes   Without correction:      With correction: 20/30 20/40 20/20

## 2020-02-10 ENCOUNTER — HOSPITAL ENCOUNTER (OUTPATIENT)
Dept: LAB | Age: 70
Discharge: HOME | End: 2020-02-10
Payer: MEDICARE

## 2020-02-10 DIAGNOSIS — I10 ESSENTIAL HYPERTENSION: ICD-10-CM

## 2020-02-10 DIAGNOSIS — E55.9 VITAMIN D DEFICIENCY: ICD-10-CM

## 2020-02-10 DIAGNOSIS — R73.9 ELEVATED BLOOD SUGAR: ICD-10-CM

## 2020-02-10 LAB
25(OH)D3 SERPL-MCNC: 25.1 NG/ML (ref 30–100)
ALBUMIN SERPL-MCNC: 3.7 G/DL (ref 3.4–5)
ALBUMIN/GLOB SERPL: 1.2 {RATIO} (ref 0.8–1.7)
ALP SERPL-CCNC: 79 U/L (ref 45–117)
ALT SERPL-CCNC: 20 U/L (ref 13–56)
ANION GAP SERPL CALC-SCNC: 4 MMOL/L (ref 3–18)
AST SERPL-CCNC: 15 U/L (ref 10–38)
BILIRUB SERPL-MCNC: 0.8 MG/DL (ref 0.2–1)
BUN SERPL-MCNC: 15 MG/DL (ref 7–18)
BUN/CREAT SERPL: 22 (ref 12–20)
CALCIUM SERPL-MCNC: 8.8 MG/DL (ref 8.5–10.1)
CHLORIDE SERPL-SCNC: 105 MMOL/L (ref 100–111)
CHOLEST SERPL-MCNC: 195 MG/DL
CO2 SERPL-SCNC: 29 MMOL/L (ref 21–32)
CREAT SERPL-MCNC: 0.69 MG/DL (ref 0.6–1.3)
EST. AVERAGE GLUCOSE BLD GHB EST-MCNC: 105 MG/DL
GLOBULIN SER CALC-MCNC: 3 G/DL (ref 2–4)
GLUCOSE SERPL-MCNC: 88 MG/DL (ref 74–99)
HBA1C MFR BLD: 5.3 % (ref 4.2–5.6)
HDLC SERPL-MCNC: 41 MG/DL (ref 40–60)
HDLC SERPL: 4.8 {RATIO} (ref 0–5)
LDLC SERPL CALC-MCNC: 106.8 MG/DL (ref 0–100)
LIPID PROFILE,FLP: ABNORMAL
POTASSIUM SERPL-SCNC: 4.2 MMOL/L (ref 3.5–5.5)
PROT SERPL-MCNC: 6.7 G/DL (ref 6.4–8.2)
SODIUM SERPL-SCNC: 138 MMOL/L (ref 136–145)
TRIGL SERPL-MCNC: 236 MG/DL (ref ?–150)
VLDLC SERPL CALC-MCNC: 47.2 MG/DL

## 2020-02-10 PROCEDURE — 83036 HEMOGLOBIN GLYCOSYLATED A1C: CPT

## 2020-02-10 PROCEDURE — 80053 COMPREHEN METABOLIC PANEL: CPT

## 2020-02-10 PROCEDURE — 82306 VITAMIN D 25 HYDROXY: CPT

## 2020-02-10 PROCEDURE — 80061 LIPID PANEL: CPT

## 2020-02-10 PROCEDURE — 36415 COLL VENOUS BLD VENIPUNCTURE: CPT

## 2020-02-18 ENCOUNTER — OFFICE VISIT (OUTPATIENT)
Dept: FAMILY MEDICINE CLINIC | Facility: CLINIC | Age: 70
End: 2020-02-18

## 2020-02-18 VITALS
RESPIRATION RATE: 17 BRPM | TEMPERATURE: 95.8 F | BODY MASS INDEX: 46.26 KG/M2 | HEART RATE: 77 BPM | WEIGHT: 245 LBS | DIASTOLIC BLOOD PRESSURE: 83 MMHG | HEIGHT: 61 IN | OXYGEN SATURATION: 100 % | SYSTOLIC BLOOD PRESSURE: 136 MMHG

## 2020-02-18 DIAGNOSIS — E55.9 VITAMIN D DEFICIENCY: ICD-10-CM

## 2020-02-18 DIAGNOSIS — R10.11 RUQ CRAMPING: Primary | ICD-10-CM

## 2020-02-18 DIAGNOSIS — Z13.31 SCREENING FOR DEPRESSION: ICD-10-CM

## 2020-02-18 DIAGNOSIS — I10 ESSENTIAL HYPERTENSION: ICD-10-CM

## 2020-02-18 NOTE — PATIENT INSTRUCTIONS
Low-Fat Diet for Gallbladder Disease: Care Instructions Your Care Instructions When you eat, the gallbladder releases bile, which helps you digest the fat in food. If you have an inflamed gallbladder, this may cause pain. A low-fat diet may give your gallbladder a rest so you can start to heal. Your doctor and dietitian can help you make an eating plan that does not irritate your digestive system. Always talk with your doctor or dietitian before you make changes in your diet. Follow-up care is a key part of your treatment and safety. Be sure to make and go to all appointments, and call your doctor if you are having problems. It's also a good idea to know your test results and keep a list of the medicines you take. How can you care for yourself at home? · Eat many small meals and snacks each day instead of three large meals. · Choose lean meats. ? Eat no more than 5 to 6½ ounces of meat a day. ? Cut off all fat you can see. ? Eat chicken and turkey without the skin. ? Many types of fish, such as salmon, lake trout, tuna, and herring, provide healthy omega-3 fat. But, avoid fish canned in oil, such as sardines in olive oil. ? Bake, broil, or grill meats, poultry, or fish instead of frying them in butter or fat. · Drink or eat nonfat or low-fat milk, yogurt, cheese, or other milk products each day. ? Read the labels on cheeses, and choose those with less than 5 grams of fat an ounce. ? Try fat-free sour cream, cream cheese, or yogurt. ? Avoid cream soups and cream sauces on pasta. ? Eat low-fat ice cream, frozen yogurt, or sorbet. Avoid regular ice cream. 
· Eat whole-grain cereals, breads, crackers, rice, or pasta. Avoid high-fat foods such as croissants, scones, biscuits, waffles, doughnuts, muffins, granola, and high-fat breads. · Flavor your foods with herbs and spices (such as basil, tarragon, or mint), fat-free sauces, or lemon juice instead of butter.  You can also use butter substitutes, fat-free mayonnaise, or fat-free dressing. · Try applesauce, prune puree, or mashed bananas to replace some or all of the fat when you bake. · Limit fats and oils, such as butter, margarine, mayonnaise, and salad dressing, to no more than 1 tablespoon a meal. 
· Avoid high-fat foods, such as: 
? Chocolate, whole milk, ice cream, and processed cheese. ? Fried or buttered foods. ? Sausage, salami, and smalls. ? Cinnamon rolls, cakes, pies, cookies, and other pastries. ? Prepared snack foods, such as potato chips, nut and granola bars, and mixed nuts. ? Coconut and avocado. · Learn how to read food labels for serving sizes and ingredients. Fast-food and convenience-food meals often have lots of fat. Where can you learn more? Go to http://yaritza-lucy.info/. Enter S004 in the search box to learn more about \"Low-Fat Diet for Gallbladder Disease: Care Instructions. \" Current as of: November 7, 2018 Content Version: 12.2 © 4313-8948 [employer]. Care instructions adapted under license by [employer] (which disclaims liability or warranty for this information). If you have questions about a medical condition or this instruction, always ask your healthcare professional. [place] [address] any warranty or liability for your use of this information.

## 2020-02-18 NOTE — PROGRESS NOTES
ROOM # 3  Identified pt with two pt identifiers(name and ). Reviewed record in preparation for visit and have obtained necessary documentation. Chief Complaint   Patient presents with    Cholesterol Problem     f/u    Hypertension     f/u      Yoni Eubanks preferred language for health care discussion is english/other. Is the patient using any DME equipment during OV? Yes / cane     Yoni Layman is due for:  Health Maintenance Due   Topic    Shingrix Vaccine Age 50> (2 of 2)     Health Maintenance reviewed and discussed per provider  Please order/place referral if appropriate. Advance Directive:  1. Do you have an advance directive in place? Patient Reply: NO    2. If not, would you like material regarding how to put one in place? NO    Coordination of Care:  1. Have you been to the ER, urgent care clinic since your last visit? Hospitalized since your last visit? NO    2. Have you seen or consulted any other health care providers outside of the [address] [place] [address] since your last visit? Include any pap smears or colon screening. NO    Patient is accompanied by self I have received verbal consent from Yoni Eubanks to discuss any/all medical information while they are present in the room.     Learning Assessment:  Learning Assessment 2017 2014 3/3/2014   PRIMARY LEARNER Patient Patient Patient   HIGHEST LEVEL OF EDUCATION - PRIMARY LEARNER  GRADUATED HIGH SCHOOL OR GED - -   BARRIERS PRIMARY LEARNER NONE - -   CO-LEARNER CAREGIVER No - -   PRIMARY LANGUAGE ENGLISH ENGLISH ENGLISH   LEARNER PREFERENCE PRIMARY DEMONSTRATION LISTENING LISTENING     LISTENING - -     READING - -     PICTURES - -   ANSWERED BY patient pt pt   RELATIONSHIP SELF SELF SELF     Depression Screening:  3 most recent [facility] [place] Screens 2019 2019 10/16/2018 2018 2017 2017 2016   PHQ Not Done - - - - - Active Diagnosis of Depression or Bipolar Disorder -   Little interest or pleasure in doing things Not at all Not at all Not at all Not at all Not at all Not at all Not at all   Feeling down, depressed, irritable, or hopeless Not at all Not at all Not at all Not at all Not at all Not at all Not at all   Total Score PHQ 2 0 0 0 0 0 0 0     Abuse Screening:  Abuse Screening Questionnaire 8/19/2019 6/14/2018 6/7/2016   Do you ever feel afraid of your partner? N N N   Are you in a relationship with someone who physically or mentally threatens you? N N N   Is it safe for you to go home? Kikaolena Fallen     Fall Risk  Fall Risk Assessment, last 12 mths 5/28/2019 5/28/2019 6/14/2018 6/13/2017 6/13/2017 6/7/2016 2/3/2016   Able to walk?  (No Data) Yes Yes Yes Yes Yes Yes   Fall in past 12 months? - No No No No No No

## 2020-02-18 NOTE — PROGRESS NOTES
Internal Medicine Progress Note    Today's Date:  2020   Patient:  Vibha Rawls  Patient :  1950    Subjective:     Chief Complaint   Patient presents with    Cholesterol Problem     f/u    Hypertension     f/u    Abdominal Pain      Hypertension   This is a chronic problem. BP is at goal. Pt takes lopressor, norvasc and candesartan. Pt reports compliance with these medications. Atrial fibrillation  This is a chronic problem. This is at goal. Pt is on coumadin and a beta blocker. Pt was taken off amiodarone. Pt is seeing Dr. Flory Ordaz and Dr. Tasha Dunn. Pt is s/p cardiac ablation and cardioversion. Obesity Class III/Hyperlipidemia  This is a chronic problem. This is not at goal. Pt was on the ketogenic/IF diet. She lost weight since the last visit. Abdominal cramping  This is a new problem. This is not at goal. Pain is located in the RUQ. Pt denies pain. 3 most recent PHQ Screens 2020   PHQ Not Done -   Little interest or pleasure in doing things Not at all   Feeling down, depressed, irritable, or hopeless Not at all   Total Score PHQ 2 0     Past Medical History:   Diagnosis Date    Acute bilateral low back pain without sciatica 2018    Advance directive discussed with patient 2016    Pt would like to appoint her son, Abbey Paulino as her medical decision maker in the event that she can no longer do so. Phone number is [phone]. Her secondary person is her niece, Francoise Vera. Phone number is [phone]. If her death is imminent and medical treatment will not help her recover, pt does not want life prolonging measures. If her condition makes her unaware of h    Dizziness 5/3/2016    Hypertriglyceridemia 2016    Mixed hyperlipidemia 2019    Obesity, Class III, BMI 40-49.9 (morbid obesity) ([facility] [address] 2015    Osteoarthritis     Shingles 2015    Vitamin D deficiency 2020     History reviewed.  No pertinent surgical history. reports that she has never smoked. She has never used smokeless tobacco. She reports that she does not drink alcohol or use drugs. Family History   Problem Relation Age of Onset    Heart Disease Father         unknown, ? MI at age 46    Arthritis-osteo Sister      No Known Allergies     Review of Systems   CV:      chest pain, palpitations  PULM:  SOB, wheezing, cough, sputum production    Current Outpatient Meds     Current Outpatient Medications on File Prior to Visit   Medication Sig Dispense Refill    metoprolol tartrate (LOPRESSOR) 100 mg IR tablet Take  by mouth two (2) times a day.  warfarin (COUMADIN) 2.5 mg tablet Take 1 Tab by mouth daily. 90 Tab 3    acetaminophen (TYLENOL) 650 mg TbER Take 2 Tabs by mouth every eight (8) hours. 90 Tab 3    pravastatin (PRAVACHOL) 40 mg tablet Take 1 Tab by mouth nightly. 90 Tab 3    candesartan (ATACAND) 32 mg tablet Take 1 Tab by mouth daily. 90 Tab 3    amLODIPine (NORVASC) 5 mg tablet Take 1 Tab by mouth daily. (Patient taking differently: Take 10 mg by mouth daily.) 90 Tab 3    cyclobenzaprine (FLEXERIL) 10 mg tablet Take 1 Tab by mouth three (3) times daily as needed for Muscle Spasm(s). 30 Tab 0     No current facility-administered medications on file prior to visit.       Objective:       Visit Vitals  /83 (BP 1 Location: Left arm, BP Patient Position: Sitting)   Pulse 77   Temp 95.8 °F (35.4 °C) (Oral)   Resp 17   Ht 5' 1\" (1.549 m)   Wt 245 lb (111.1 kg)   SpO2 100%   BMI 46.29 kg/m²     General:   Well-nourished, well-groomed, pleasant, alert, in no acute distress  Head:  Normocephalic, atraumatic  Ears:  External ears WNL  Nose:  External nares WNL  Psych:  No pressured speech, no abnormal thought content    Hospital Outpatient Visit on 02/10/2020   Component Date Value Ref Range Status    Sodium 02/10/2020 138  136 - 145 mmol/L Final    Potassium 02/10/2020 4.2  3.5 - 5.5 mmol/L Final    Chloride 02/10/2020 105  100 - 111 mmol/L Final    CO2 02/10/2020 29  21 - 32 mmol/L Final    Anion gap 02/10/2020 4  3.0 - 18 mmol/L Final    Glucose 02/10/2020 88  74 - 99 mg/dL Final    BUN 02/10/2020 15  7.0 - 18 MG/DL Final    Creatinine 02/10/2020 0.69  0.6 - 1.3 MG/DL Final    BUN/Creatinine ratio 02/10/2020 22* 12 - 20   Final    GFR est AA 02/10/2020 >60  >60 ml/min/1.73m2 Final    GFR est non-AA 02/10/2020 >60  >60 ml/min/1.73m2 Final    Comment: (NOTE)  Estimated GFR is calculated using the Modification of Diet in Renal   Disease (MDRD) Study equation, reported for both  Americans   (GFRAA) and non- Americans (GFRNA), and normalized to 1.73m2   body surface area. The physician must decide which value applies to   the patient. The MDRD study equation should only be used in   individuals age 25 or older. It has not been validated for the   following: pregnant women, patients with serious comorbid conditions,   or on certain medications, or persons with extremes of body size,   muscle mass, or nutritional status.  Calcium 02/10/2020 8.8  8.5 - 10.1 MG/DL Final    Bilirubin, total 02/10/2020 0.8  0.2 - 1.0 MG/DL Final    ALT (SGPT) 02/10/2020 20  13 - 56 U/L Final    AST (SGOT) 02/10/2020 15  10 - 38 U/L Final    Alk. phosphatase 02/10/2020 79  45 - 117 U/L Final    Protein, total 02/10/2020 6.7  6.4 - 8.2 g/dL Final    Albumin 02/10/2020 3.7  3.4 - 5.0 g/dL Final    Globulin 02/10/2020 3.0  2.0 - 4.0 g/dL Final    A-G Ratio 02/10/2020 1.2  0.8 - 1.7   Final    LIPID PROFILE 02/10/2020        Final    Cholesterol, total 02/10/2020 195  <200 MG/DL Final    Triglyceride 02/10/2020 236* <150 MG/DL Final    Comment: The drugs N-acetylcysteine (NAC) and  Metamiszole have been found to cause falsely  low results in this chemical assay. Please  be sure to submit blood samples obtained  BEFORE administration of either of these  drugs to assure correct results.       HDL Cholesterol 02/10/2020 41  40 - 60 MG/DL Final    LDL, calculated 02/10/2020 106.8* 0 - 100 MG/DL Final    VLDL, calculated 02/10/2020 47.2  MG/DL Final    CHOL/HDL Ratio 02/10/2020 4.8  0 - 5.0   Final    Hemoglobin A1c 02/10/2020 5.3  4.2 - 5.6 % Final    Comment: (NOTE)  HbA1C Interpretive Ranges  <5.7              Normal  5.7 - 6.4         Consider Prediabetes  >6.5              Consider Diabetes      Est. average glucose 02/10/2020 105  mg/dL Final    Comment: (NOTE)  The eAG should be interpreted with patient characteristics in mind   since ethnicity, interindividual differences, red cell lifespan,   variation in rates of glycation, etc. may affect the validity of the   calculation.  Vitamin D 25-Hydroxy 02/10/2020 25.1* 30 - 100 ng/mL Final    Comment: (NOTE)  Deficiency               <20 ng/mL  Insufficiency          20-30 ng/mL  Sufficient             ng/mL  Possible toxicity       >100 ng/mL    The Method used is Siemens Advia Centaur currently standardized to a   Center of Disease Control and Prevention (CDC) certified reference   [address]. Samples containing fluorescein dye can produce falsely   elevated values when tested with the ADVIA Centaur Vitamin D Assay. It is recommended that results in the toxic range, >100 ng/mL, be   retested 72 hours post fluorescein exposure. Assessment/Plan & Orders:         ICD-10-CM ICD-9-CM    1. RUQ cramping R10.11 789.01 US ABD LTD   2. Essential hypertension I10 401.9    3. Vitamin D deficiency E55.9 268.9    4. Screening for depression Z13.31 V79.0 LA PATIENT SCREENED FOR DEPRESSION      Diet with exercise  Follow up with cardiology and orthopedics  Pt declined abdominal exam because she was concerned about getting up on the examining table    Follow-up and Dispositions    · Return in about 6 months (around 8/18/2020) for Hypertension, Weight management, Pain, Medicare wellness, 30 minutes. *Patient verbalized understanding and agreement with the plan.   Patient was given an after-visit summary. Catia Garcia.  Jony Wallace MD - Internal Medicine  2/18/2020, 9:38 AM  [facility]  [address] [place], [address]  Phone [phone]  Fax [phone]

## 2020-02-25 ENCOUNTER — TELEPHONE (OUTPATIENT)
Dept: FAMILY MEDICINE CLINIC | Facility: CLINIC | Age: 70
End: 2020-02-25

## 2020-02-25 DIAGNOSIS — R16.0 LIVER MASS, LEFT LOBE: Primary | ICD-10-CM

## 2020-02-26 NOTE — TELEPHONE ENCOUNTER
Patient called and stated that she is not able to have a MRI done because she has a Pacemaker. Please contact her back to advise her on how you would like to proceed.

## 2020-02-28 NOTE — TELEPHONE ENCOUNTER
LPN spoke with patient. Patient is ok with CT and would like imaging to be done at [facility]. Order was fax.

## 2020-03-12 DIAGNOSIS — R10.11 RUQ CRAMPING: ICD-10-CM

## 2020-03-24 DIAGNOSIS — R16.0 LIVER MASS, LEFT LOBE: ICD-10-CM

## 2020-06-30 ENCOUNTER — PATIENT OUTREACH (OUTPATIENT)
Dept: CASE MANAGEMENT | Age: 70
End: 2020-06-30

## 2020-06-30 NOTE — PROGRESS NOTES
Patient was admitted to [facility] on 20 and discharged on 20 for Shortness of breath, HTN. Patient was contacted within 1 business day of discharge. Top Discharge Challenges to be reviewed by the provider   Additional needs identified to be addressed with provider no  none    Method of communication with provider : none       Advance Care Planning:   Does patient have an Advance Directive:  reviewed and current     Inpatient Readmission Risk score: n/a  Was this a readmission? no   Patient stated reason for the admission: n/a  Patients top risk factors for readmission: medical condition  Interventions to address risk factors: Follow up with PCP and Cardiology    Care Transition Nurse (CTN) contacted the patient by telephone to perform post hospital discharge assessment. Verified name and  with patient as identifiers. Provided introduction to self, and explanation of the CTN role. Patient reported that she is doing well. Pt. Reported that her SOB is better. Pt. Denied CP, increase SOB, difficulty breathing, fever and chills at this time. CDC guidelines,and Red flags on when to go back to ED (Chest pain, difficulty breathing, shortness of breath, high fevers and/or worsening of symptoms) were reviewed and discuss with Pt. Pt. Verbalized and repeated back understanding. CTN reviewed discharge instructions, medical action plan and red flags with patient who verbalized understanding. Patient given an opportunity to ask questions and does not have any further questions or concerns at this time. The patient agrees to contact the PCP office for questions related to their healthcare. Medication reconciliation was performed with patient, who verbalizes understanding of administration of home medications. Advised obtaining a 90-day supply of all daily and as-needed medications.    Referral to Pharm D needed: no     Home Health/Outpatient orders at discharge: none    Durable Medical Equipment ordered at discharge: none noted and none as per Pt. Covid Risk Education    Patient has following risk factors of: age. Education provided regarding infection prevention, and signs and symptoms of COVID-19 and when to seek medical attention with patient who verbalized understanding. Discussed exposure protocols and quarantine From CDC: Are you at higher risk for severe illness?  and given an opportunity for questions and concerns. The patient agrees to contact the COVID-19 hotline [phone] or PCP office for questions related to COVID-19. For more information on steps you can take to protect yourself, see CDC's How to Protect Yourself       Discussed follow-up appointments. If no appointment was previously scheduled, appointment scheduling offered: yes, Pt. Declined sooner appt. [facility] follow up appointment(s):   Future Appointments   Date Time Provider Bertin Morillo   7/10/2020 11:00 AM [facility] SCHED   8/18/2020 10:00 AM Ann Goodpasture, MD [facility] SCHED     Non-[facility] follow up appointment(s): Cardiology will call Pt. For follow up. Plan for follow-up call in 10-14 days based on severity of symptoms and risk factors. CTN provided contact information for future needs. Goals      Prevent complications post hospitalization. Pt. Will follow up with PCP and Cardiology Dr. Torito Brown. Will continue to take all medications as prescribed. Pt. Will call PCP or CTN for any questions and/or concerns       Understands red flags post discharge. Patient will go to the nearest emergency room for chest pain, shortness of breath, returning of symptoms that brought her to the emergency room and/or worsening of symptoms. Patient will contact PCP office for any questions or concerns related to healthcare.

## 2020-07-10 ENCOUNTER — VIRTUAL VISIT (OUTPATIENT)
Dept: FAMILY MEDICINE CLINIC | Facility: CLINIC | Age: 70
End: 2020-07-10

## 2020-07-10 DIAGNOSIS — G47.00 INSOMNIA, UNSPECIFIED TYPE: ICD-10-CM

## 2020-07-10 DIAGNOSIS — E78.5 HYPERLIPIDEMIA, UNSPECIFIED HYPERLIPIDEMIA TYPE: ICD-10-CM

## 2020-07-10 DIAGNOSIS — I48.91 ATRIAL FIBRILLATION, UNSPECIFIED TYPE (HCC): Primary | ICD-10-CM

## 2020-07-10 DIAGNOSIS — I10 ESSENTIAL HYPERTENSION: ICD-10-CM

## 2020-07-28 RX ORDER — FUROSEMIDE 20 MG/1
20 TABLET ORAL DAILY
COMMUNITY
Start: 2020-07-07

## 2020-07-28 NOTE — PATIENT INSTRUCTIONS

## 2020-07-28 NOTE — PROGRESS NOTES
HISTORY OF PRESENT ILLNESS  Pratibha Hudson is a 69 y.o. female. HPI   Pt is being seen via telephone call for f/u from the ER for SOB on 6/28/20. Pt states everything at the ER Was ok and she was advised to f/u with her cardiologist. She saw them yest and and she was started on lasix and multaq. Pt was also back in [facility]. She has a pacemaker and is on coumadin. Pt is doing well with pacemaker. She has had trouble sleeping and on the nights she does she took tylenol PM and it helped. Pt wanted to know if it was ok to do that as needed. Advised pt if she is using it more than 3x a week to follow up and that she could try melatonin as well. No Known Allergies    Current Outpatient Medications   Medication Sig    dronedarone (MULTAQ) tab tablet Take 400 mg by mouth two (2) times daily (with meals).  furosemide (LASIX) 20 mg tablet Take 20 mg by mouth daily.  metoprolol tartrate (LOPRESSOR) 100 mg IR tablet Take  by mouth two (2) times a day.  warfarin (COUMADIN) 2.5 mg tablet Take 1 Tab by mouth daily.  acetaminophen (TYLENOL) 650 mg TbER Take 2 Tabs by mouth every eight (8) hours.  pravastatin (PRAVACHOL) 40 mg tablet Take 1 Tab by mouth nightly.  candesartan (ATACAND) 32 mg tablet Take 1 Tab by mouth daily.  amLODIPine (NORVASC) 5 mg tablet Take 1 Tab by mouth daily. (Patient taking differently: Take 10 mg by mouth daily.)    cyclobenzaprine (FLEXERIL) 10 mg tablet Take 1 Tab by mouth three (3) times daily as needed for Muscle Spasm(s). No current facility-administered medications for this visit. Review of Systems   Constitutional: Negative. Negative for chills, fever and malaise/fatigue. HENT: Negative. Negative for congestion, ear pain, sore throat and tinnitus. Eyes: Negative. Negative for blurred vision, double vision and photophobia. Respiratory: Positive for shortness of breath (improved). Negative for cough and wheezing.     Cardiovascular: Positive for leg swelling (improved). Negative for chest pain and palpitations. Gastrointestinal: Negative. Negative for abdominal pain, heartburn, nausea and vomiting. Genitourinary: Negative. Negative for dysuria, frequency, hematuria and urgency. Musculoskeletal: Negative. Negative for back pain, joint pain, myalgias and neck pain. Skin: Negative. Negative for itching and rash. Neurological: Negative. Negative for dizziness, tingling, tremors and headaches. Psychiatric/Behavioral: Negative for depression and memory loss. The patient has insomnia. The patient is not nervous/anxious. Physical Exam  Constitutional:       General: She is not in acute distress. Appearance: She is not ill-appearing. Neurological:      Mental Status: She is alert and oriented to person, place, and time. Psychiatric:         Mood and Affect: Mood normal.         Behavior: Behavior normal.         Thought Content: Thought content normal.         Judgment: Judgment normal.         ASSESSMENT and PLAN    ICD-10-CM ICD-9-CM    1. [facility]rial fibrillation, unspecified type [facility]  I48.91 427.31    2. Hyperlipidemia, unspecified hyperlipidemia type  E78.5 272.4    3. Essential hypertension  I10 401.9    4. Insomnia, unspecified type  G47.00 780.52      Follow-up and Dispositions    · Return in about 3 months (around 10/10/2020) for follow up. Pt expressed understanding of visit summary and plans for any follow ups or referrals as well as any medications prescribed. Seen by telephone call lasting approx 28min on 07/10/2020    The patient is aware that this patient-initiated Telehealth encounter is a billable service, with coverage as determined by his or her insurance carrier. He/She is aware that they may receive a bill and has provided verbal consent to proceed: Yes        I was in the office while conducting this encounter.

## 2020-09-02 ENCOUNTER — PATIENT OUTREACH (OUTPATIENT)
Dept: CASE MANAGEMENT | Age: 70
End: 2020-09-02

## 2020-09-02 RX ORDER — CLOPIDOGREL BISULFATE 75 MG/1
TABLET ORAL
COMMUNITY
Start: 2020-09-01

## 2020-09-02 RX ORDER — METOCLOPRAMIDE 5 MG/1
TABLET ORAL
COMMUNITY
Start: 2020-09-01 | End: 2020-09-08

## 2020-09-02 RX ORDER — OXYCODONE HYDROCHLORIDE 5 MG/1
TABLET ORAL
COMMUNITY
Start: 2020-09-01

## 2020-09-02 RX ORDER — NALOXONE HYDROCHLORIDE 4 MG/.1ML
SPRAY NASAL
COMMUNITY
Start: 2020-09-01

## 2020-09-02 NOTE — PROGRESS NOTES
Patient was admitted to [facility]  on 20 and discharged on 20 for Right transfemoral (TAVR) transcatheter aortic valve replacement with a Medtronic Evolut. Left heart catheterization, aortogram, peripheral angiography, temporary cardiac pacing and wire. Patient was contacted within 1 business day of discharge. Top Discharge Challenges to be reviewed by the provider   Additional [facility] identified to be addressed with provider [facility]    Method of communication with provider : none       Advance Care Planning:   Does patient have an Advance Directive:  not on file     Inpatient Readmission Risk score: none available at this time. Was this a readmission? no   Patient stated reason for the admission: n/a    Patients top risk factors for readmission: medical condition     Interventions to address risk factors: follow up PCP, Vascular Surgeon, Heart clinic, Cardiology and work closely with home health. Care Transition Nurse (CTN) contacted the patient by telephone to perform post hospital discharge assessment. Verified name and  with patient as identifiers. Provided introduction to self, and explanation of the CTN role. Patient reported that she is doing well and feeling way better today than yesterday. Pt. Reported that she has all her medications and no problem of getting her medications. Pt states that she has good support from friends and neighbors. Pt. Reported that she is ambulatory with use of cane or walker. Pt. Reported that she is independent with her ADLs. Fall precaution prevention reviewed and discussed with Pt. , Pt. Verbalized understanding. Pt. Aware of her appointments with PCP, Vascular and cardiology. Pt. Denied swelling,rednes, pain and warmth to surgical site. No questions, concerns and/or needs at this time as per Pt.      CDC guidelines, and Red flags on when to go back to ED (Chest pain, difficulty breathing, shortness of breath, high fevers and/or worsening of symptoms) were reviewed and discuss with Pt. Pt. Verbalized and repeated back understanding    CTN reviewed discharge instructions, medical action plan and red flags with patient who verbalized understanding. Patient given an opportunity to ask questions and does not have any further questions or concerns at this time. The patient agrees to contact the PCP office for questions related to their healthcare. Medication reconciliation was performed with patient, who verbalizes understanding of administration of home medications. Advised obtaining a 90-day supply of all daily and as-needed medications. Referral to Pharm D needed: no     Home Health/Outpatient orders at discharge: home health care  [address]: [facility]  Date of initial visit: awaiting for call. Durable Medical Equipment ordered at discharge: [address]: [address] received: 9/1/20     Covid Risk Education    Patient has following risk factors of: age. Education provided regarding infection prevention, and signs and symptoms of COVID-19 and when to seek medical attention with patient who verbalized understanding. Discussed exposure protocols and quarantine From CDC: Are you at higher risk for severe illness?  and given an opportunity for questions and concerns. The patient agrees to contact the COVID-19 hotline 168-988-5536 or PCP office for questions related to COVID-19. For more information on steps you can take to protect yourself, see CDC's How to Protect Yourself     Patient/family/caregiver given information for GetWell Loop and agrees to enroll no      Discussed follow-up appointments. If no appointment was previously scheduled, appointment scheduling offered: no,  PCP appt already scheduled.    [facility] follow up appointment(s):   Future Appointments   Date Time Provider Bertin Morillo   9/8/2020 10:00 AM MD Davion Beatty 92 SCHED   10/13/2020 10:00 AM Consuelo Penn MD [address] [facility] follow up appointment(s):     Appointment Information  Encounter Information Taken from [address], MD office epic. Provider Department Encounter # Center   9/21/2020 1:30 PM [facility] CDU ECHO RM [facility] Cardiac Dx [id number] [facility]   Future Appointments     Provider Bertin Morillo   9/11/2020 9:40 AM [address] [facility] [place] [facility]    9/21/2020 1:30 PM [facility] CDU ECHO  [facility] Cardiology [facility]   9/22/2020 11:20 AM Wicho Maldonado NP [facility]    9/24/2020 12:30 PM [facility] STRUCTURAL HEART  [facility]   9/28/2020 11:00 AM Antonio Hampton NP [facility] VTS-BH   10/13/2020 10:45 AM Elie Abdullahi [facility]    11/27/2020 7:00  [facility] Special[facility]    12/1/2020 10:05 AM Wicho Maldonado NP [facility]    12/15/2020 10:40 AM [facility]      Plan for follow-up call in 10-14 days based on severity of symptoms and risk factors. CTN provided contact information for future needs. Goals      Prevent complications post hospitalization. Pt. Will attend ludivina with PCP, Cardiology and Vascular Dr. Anya Heath. Will continue to take all medications as prescribed. Pt. Will call PCP or CTN for any questions and/or concerns. Pt. Will follow Discharge orders instructions from latest hospital visit.  Understands red flags post discharge. Patient will go to the nearest emergency room for chest pain, shortness of breath, returning of symptoms that brought her to the emergency room and/or worsening of symptoms. Patient will contact PCP office for any questions or concerns related to healthcare.

## 2020-09-08 ENCOUNTER — VIRTUAL VISIT (OUTPATIENT)
Dept: FAMILY MEDICINE CLINIC | Facility: CLINIC | Age: 70
End: 2020-09-08

## 2020-09-08 VITALS
DIASTOLIC BLOOD PRESSURE: 70 MMHG | HEIGHT: 61 IN | SYSTOLIC BLOOD PRESSURE: 140 MMHG | WEIGHT: 245 LBS | BODY MASS INDEX: 46.26 KG/M2

## 2020-09-08 DIAGNOSIS — Z71.89 ADVANCED CARE PLANNING/COUNSELING DISCUSSION: ICD-10-CM

## 2020-09-08 DIAGNOSIS — E66.01 OBESITY, CLASS III, BMI 40-49.9 (MORBID OBESITY) (HCC): ICD-10-CM

## 2020-09-08 DIAGNOSIS — R73.9 ELEVATED BLOOD SUGAR: ICD-10-CM

## 2020-09-08 DIAGNOSIS — Z13.31 SCREENING FOR DEPRESSION: ICD-10-CM

## 2020-09-08 DIAGNOSIS — I10 ESSENTIAL HYPERTENSION: ICD-10-CM

## 2020-09-08 DIAGNOSIS — Z00.00 MEDICARE ANNUAL WELLNESS VISIT, SUBSEQUENT: Primary | ICD-10-CM

## 2020-09-08 DIAGNOSIS — E55.9 VITAMIN D DEFICIENCY: ICD-10-CM

## 2020-09-08 DIAGNOSIS — E78.2 MIXED HYPERLIPIDEMIA: ICD-10-CM

## 2020-09-08 NOTE — PROGRESS NOTES
Abdiaziz Hernandez is a 69 y.o. female, evaluated via audio-only technology on 9/8/2020 for Hospital Follow Up (sentara ) and Annual Wellness Visit    Assessment & Plan:   Diagnoses and all orders for this visit:    1. Medicare annual wellness visit, subsequent    2. Essential hypertension  -     CBC WITH AUTOMATED DIFF; Future    3. Mixed hyperlipidemia  -     LIPID PANEL; Future  -     METABOLIC PANEL, COMPREHENSIVE; Future    4. Obesity, Class III, BMI 40-49.9 (morbid obesity) (HCC)    5. Elevated blood sugar  -     HEMOGLOBIN A1C WITH EAG; Future    6. Vitamin D deficiency  -     VITAMIN D, 25 HYDROXY; Future    7. Screening for depression  -     [place] DEPRESSION SCREEN ANNUAL    8. Advanced care planning/counseling discussion  -     ADVANCE CARE PLANNING FIRST 30 MINS    Advance directive form will be mailed to the pt    Follow-up and Dispositions    · Return in about 3 months (around 12/8/2020). Subjective:     Transitional care   Pt was discharged from the hospital on 9/1/20. Nurse navigator spoke with the pt on 9/2/20. Pt was seen virtually on 9/8/20. 9/7/30 was a federal holiday (Labor Day). The complexity of this is high. Hypertension   This is a chronic problem. BP is at goal. Pt takes lopressor and lasix. Pt reports compliance with these medications.      Atrial fibrillation  This is a chronic problem. This is at goal. Pt is on plavix, coumadin and a beta blocker. Pt has a cardiologist and cardiac surgeon.      Obesity Class III/Hyperlipidemia  This is a chronic problem. This is not at goal. Pt was on the ketogenic/IF diet. Pt takes pravastatin.     3 most recent PHQ Screens 9/8/2020   PHQ Not Done -   Little interest or pleasure in doing things Not at all   Feeling down, depressed, irritable, or hopeless Not at all   Total Score PHQ 2 0     Prior to Admission medications    Medication Sig Start Date End Date Taking?  Authorizing Provider   ergocalciferol, vitamin D2, (VITAMIN D2 PO) Take 600 mg by mouth. Yes Provider, Historical   clopidogreL (PLAVIX) 75 mg tab  9/1/20  Yes Provider, Historical   Narcan 4 mg/actuation nasal spray  9/1/20  Yes Provider, Historical   oxyCODONE IR (ROXICODONE) 5 mg immediate release tablet  9/1/20  Yes Provider, Historical   SENNA-DOCUSATE SODIUM PO Take  by mouth. Yes Provider, Historical   furosemide (LASIX) 20 mg tablet Take 20 mg by mouth daily. 7/7/20  Yes Provider, Historical   metoprolol tartrate (LOPRESSOR) 100 mg IR tablet Take  by mouth two (2) times a day. Yes Provider, Historical   warfarin (COUMADIN) 2.5 mg tablet Take 1 Tab by mouth daily. 2/28/19  Yes Yasmani Ruiz MD   acetaminophen (TYLENOL) 650 mg TbER Take 2 Tabs by mouth every eight (8) hours. 2/28/19  Yes Yasmani Ruiz MD   pravastatin (PRAVACHOL) 40 mg tablet Take 1 Tab by mouth nightly. 2/28/19  Yes Yasmani Ruiz MD   metoclopramide HCl (REGLAN) 5 mg tablet  9/1/20   Provider, Historical   dronedarone (MULTAQ) tab tablet Take 400 mg by mouth two (2) times daily (with meals). 7/9/20   Provider, Historical   candesartan (ATACAND) 32 mg tablet Take 1 Tab by mouth daily. 2/28/19   Yasmani Ruiz MD   amLODIPine (NORVASC) 5 mg tablet Take 1 Tab by mouth daily. Patient taking differently: Take 10 mg by mouth daily. 2/28/19   Yasmani Ruiz MD   cyclobenzaprine (FLEXERIL) 10 mg tablet Take 1 Tab by mouth three (3) times daily as needed for Muscle Spasm(s). 1/31/19   Yasmani Ruiz MD     Patient Active Problem List   Diagnosis Code    Aortic valve stenosis I35.0    Atrial fibrillation ([facility] I48.91    Essential hypertension I10    Obesity, Class III, BMI 40-49.9 (morbid obesity) ([address] E66.01    Osteoarthritis M19.90    Sick sinus syndrome ([facility] I49.5    Mixed hyperlipidemia E78.2    Vitamin D deficiency E55.9     ROS  CV: no chest pain or palpitation  RESP: no cough or SOB    No flowsheet data found.      Pola Arce, who was evaluated through a patient-initiated, synchronous (real-time) audio only encounter, and/or her healthcare decision maker, is aware that it is a billable service, with coverage as determined by her insurance carrier. She provided verbal consent to proceed: Yes. She has not had a related appointment within my department in the past 7 days or scheduled within the next 24 hours.     Total Time: minutes: 21-30 minutes    Radha Cortez MD

## 2020-09-08 NOTE — PATIENT INSTRUCTIONS
Schedule of Personalized Health Plan (Provide Copy to Patient) The best way to stay healthy is to live a healthy lifestyle. A healthy lifestyle includes regular exercise, eating a well-balanced diet, keeping a healthy weight and not smoking. Regular physical exams and screening tests are another important way to take care of yourself. Preventive exams provided by health care providers can find health problems early when treatment works best and can keep you from getting certain diseases or illnesses. Preventive services include exams, lab tests, screenings, shots, monitoring and information to help you take care of your own health. All people over 65 should have a pneumonia shot. Pneumonia shots are usually only needed once in a lifetime unless your doctor decides differently. All people over 65 should have a yearly flu shot. People over 65 are at medium to high risk for Hepatitis B. Three shots are needed for complete protection. In addition to your physical exam, some screening tests are recommended: 
 
 
Screening for Breast Cancer is recommended yearly with a mammogram. 
 
Screening for Cervical Cancer is recommended every two years (annually for certain risk factors, such as previous history of STD or abnormal PAP in past 7 years), with a Pelvic Exam with PAP 
 
 Here is a list of your current Health Maintenance items with a due date: 
Health Maintenance Topic Date Due  
 FOBT Q1Y Age,18+  03/04/2020  GLAUCOMA SCREENING Q2Y  06/14/2020  Flu Vaccine (1) 09/01/2020  Medicare Yearly Exam  08/19/2020  Shingrix Vaccine Age 50> (2 of 2) 09/23/2021 (Originally 10/23/2019)  Lipid Screen  02/10/2021  Breast Cancer Screen Mammogram  07/14/2022  DTaP/Tdap/Td series (2 - Td) 06/07/2026  Hepatitis C Screening  Completed  Bone Densitometry (Dexa) Screening  Completed  Pneumococcal 65+ years  Completed

## 2020-09-08 NOTE — ACP (ADVANCE CARE PLANNING)
Pt would like to appoint her niece, Karen Jain, as her medical decision maker in the event that she can no longer do so. Phone number is [phone]. Her secondary person is her son, Kemal Rivas. Phone number is [phone]. He lives in [place] [facility]. An additional contact info for her son is his wife's information. The name of his wife is Aayush Luis. Her phone number is [phone]. If her death is imminent and medical treatment will not help her recover, pt does not want life prolonging measures. If her condition makes her unaware of herself or her surroundings and she cannot interact with others, pt does not want life prolonging measures.

## 2020-09-08 NOTE — PROGRESS NOTES
Murl Lesch is a 69 y.o. female and presents for annual Medicare Wellness Visit. Problem List: Reviewed with patient and discussed risk factors. Patient Active Problem List   Diagnosis Code    Aortic valve stenosis I35.0    Atrial fibrillation ([facility] I48.91    Essential hypertension I10    Obesity, Class III, BMI 40-49.9 (morbid obesity) ([facility] E66.01    Osteoarthritis M19.90    Sick sinus syndrome ([facility] I49.5    Mixed hyperlipidemia E78.2    Vitamin D deficiency E55.9     Current medical providers:  Patient Care Team:  Therese Mckenna MD as PCP - General (Internal Medicine)  Therese Mckenna MD as PCP [facility]  Surjit Garcia MD (Cardiology)  [address], Ernestina Valente DO (Cardiothoracic Surgery)  Shelley Young, [address] (Optometry)  Milly Vera DDS as Physician ([address])  Ashley Solitario RN as Care Transitions Nurse    PSH: Reviewed with patient  No past surgical history on file. SH: Reviewed with patient  Social History     Tobacco Use    Smoking status: Never Smoker    Smokeless tobacco: Never Used   Substance Use Topics    Alcohol use: No    Drug use: No       FH: Reviewed with patient  Family History   Problem Relation Age of Onset    Heart Disease Father         unknown, ? MI at age 46    Arthritis-osteo Sister      Medications/Allergies: Reviewed with patient  Current Outpatient Medications on File Prior to Visit   Medication Sig Dispense Refill    ergocalciferol, vitamin D2, (VITAMIN D2 PO) Take 600 mg by mouth.  clopidogreL (PLAVIX) 75 mg tab       Narcan 4 mg/actuation nasal spray       oxyCODONE IR (ROXICODONE) 5 mg immediate release tablet       SENNA-DOCUSATE SODIUM PO Take  by mouth.  furosemide (LASIX) 20 mg tablet Take 20 mg by mouth daily.  metoprolol tartrate (LOPRESSOR) 100 mg IR tablet Take  by mouth two (2) times a day.  warfarin (COUMADIN) 2.5 mg tablet Take 1 Tab by mouth daily.  90 Tab 3  acetaminophen (TYLENOL) 650 mg TbER Take 2 Tabs by mouth every eight (8) hours. 90 Tab 3    pravastatin (PRAVACHOL) 40 mg tablet Take 1 Tab by mouth nightly. 90 Tab 3    metoclopramide HCl (REGLAN) 5 mg tablet       dronedarone (MULTAQ) tab tablet Take 400 mg by mouth two (2) times daily (with meals).  candesartan (ATACAND) 32 mg tablet Take 1 Tab by mouth daily. 90 Tab 3    amLODIPine (NORVASC) 5 mg tablet Take 1 Tab by mouth daily. (Patient taking differently: Take 10 mg by mouth daily.) 90 Tab 3    cyclobenzaprine (FLEXERIL) 10 mg tablet Take 1 Tab by mouth three (3) times daily as needed for Muscle Spasm(s). 30 Tab 0     No current facility-administered medications on file prior to visit. No Known Allergies    Objective:  Visit Vitals  Ht 5' 1\" (1.549 m)   Wt 245 lb (111.1 kg)   BMI 46.29 kg/m²    Body mass index is 46.29 kg/m². Assessment of cognitive impairment: Alert and oriented x 3  Depression Screen:   3 most recent PHQ Screens 9/8/2020   PHQ Not Done -   Little interest or pleasure in doing things Not at all   Feeling down, depressed, irritable, or hopeless Not at all   Total Score PHQ 2 0     Fall Risk Assessment:    Fall Risk Assessment, last 12 mths 9/8/2020   Able to walk? Yes   Fall in past 12 months? No     Functional Ability:   Does the patient exhibit a steady gait? yes   How long did it take the patient to get up and walk from a sitting position? 2 seconds   Is the patient self reliant?  (ie can do own laundry, meals, household chores)  yes   Does the patient handle his/her own medications? yes   Does the patient handle his/her own money? yes   Is the patients home safe (ie good lighting, handrails on stairs and bath, etc.)? yes   Did you notice or did patient express any hearing difficulties? Yes.  B/l hearing loss. Has hearing aids   Did you notice of did patient express any vision difficulties?   no   Were distance and reading eye charts used?   yes     Advance Care Planning:   Patient was offered the opportunity to discuss advance care planning:  yes     Does patient have an Advance Directive:  Yes. Pt would like to appoint her niece, Santa Cornelius, as her medical decision maker in the event that she can no longer do so. Phone number is [phone]. Her secondary person is her son, Jordan Sanford. Phone number is [phone]. He lives in [place]. An additional contact info for her son is his wife's information. The name of his wife is Suman Mayo. Her phone number is [phone]. If her death is imminent and medical treatment will not help her recover, pt does not want life prolonging measures. If her condition makes her unaware of herself or her surroundings and she cannot interact with others, pt does not want life prolonging measures. If no, did you provide information on Caring Connections?  no     Plan:    Orders Placed This Encounter    [facility] Maintenance   Topic Date Due    Shingrix Vaccine Age 50> (2 of 2) 10/23/2019    FOBT Q1Y Age,18+  2020    GLAUCOMA SCREENING Q2Y  2020    Flu Vaccine (1) 2020    Medicare Yearly Exam  2020    Lipid Screen  02/10/2021    Breast Cancer Screen Mammogram  2022    DTaP/Tdap/Td series (2 - Td) 2026    Hepatitis C Screening  Completed    Bone Densitometry (Dexa) Screening  Completed    Pneumococcal 65+ years  Completed     Time spent counseling pt on advanced care plannin minutes    *Patient verbalized understanding and agreement with the plan. A copy of the After Visit Summary with personalized health plan was given to the patient today. Rashel Curiel.  [address], MD - Internal Medicine  2020, 9:14 AM  [facility]  [address] [place], [address]  Phone [phone]  Fax [phone]

## 2020-09-08 NOTE — PROGRESS NOTES
Murl Lesch is a 69 y.o.  female presents today for office visit for follow up. Pt would also like to discuss HTN. 1. Have you been to the ER, urgent care clinic since your last visit? Hospitalized since your last visit? No    2. Have you seen or consulted any other health care providers outside of the [address] [place] [address] since your last visit? Include any pap smears or colon screening. No    Upcoming Appts  none    Health Maintenance reviewed     VORB: No orders of the defined types were placed in this encounter.   Michelle Paniagua, TUCKERN

## 2020-09-25 ENCOUNTER — PATIENT OUTREACH (OUTPATIENT)
Dept: CASE MANAGEMENT | Age: 70
End: 2020-09-25

## 2020-09-25 NOTE — PROGRESS NOTES
Patient was admitted to [facility]  on 20 and discharged on 20 for Right transfemoral (TAVR) transcatheter aortic valve replacement with a Medtronic Evolut. Left heart catheterization, aortogram, peripheral angiography, temporary cardiac pacing and wire. Care Transition Nurse (CTN) contacted the patient by telephone for follow up. Verified name and  with patient as identifiers. Provided introduction to self, and explanation of the CTN role. Patient reported that she is doing well. Pt. Reported that home health still following 3x week. Pt. Aware of her upcoming appt with PCP. No questions concerns and/or needs at this time as per Pt. Pt. Attended virtual PCP appt. on 20. Patient thanked us for follow up calls.

## 2020-11-06 ENCOUNTER — VIRTUAL VISIT (OUTPATIENT)
Dept: FAMILY MEDICINE CLINIC | Age: 70
End: 2020-11-06
Payer: MEDICARE

## 2020-11-06 ENCOUNTER — HOSPITAL ENCOUNTER (OUTPATIENT)
Dept: LAB | Age: 70
Discharge: HOME | End: 2020-11-06
Payer: MEDICARE

## 2020-11-06 DIAGNOSIS — E66.01 OBESITY, CLASS III, BMI 40-49.9 (MORBID OBESITY) (HCC): ICD-10-CM

## 2020-11-06 DIAGNOSIS — E55.9 VITAMIN D DEFICIENCY: ICD-10-CM

## 2020-11-06 DIAGNOSIS — E78.2 MIXED HYPERLIPIDEMIA: ICD-10-CM

## 2020-11-06 DIAGNOSIS — R73.9 ELEVATED BLOOD SUGAR: ICD-10-CM

## 2020-11-06 DIAGNOSIS — I48.91 ATRIAL FIBRILLATION, UNSPECIFIED TYPE (HCC): ICD-10-CM

## 2020-11-06 DIAGNOSIS — I10 ESSENTIAL HYPERTENSION: Primary | ICD-10-CM

## 2020-11-06 DIAGNOSIS — I10 ESSENTIAL HYPERTENSION: ICD-10-CM

## 2020-11-06 LAB
25(OH)D3 SERPL-MCNC: 40.2 NG/ML (ref 30–100)
ALBUMIN SERPL-MCNC: 3.9 G/DL (ref 3.4–5)
ALBUMIN/GLOB SERPL: 1.4 {RATIO} (ref 0.8–1.7)
ALP SERPL-CCNC: 73 U/L (ref 45–117)
ALT SERPL-CCNC: 16 U/L (ref 13–56)
ANION GAP SERPL CALC-SCNC: 5 MMOL/L (ref 3–18)
AST SERPL-CCNC: 14 U/L (ref 10–38)
BASOPHILS # BLD: 0 K/UL (ref 0–0.1)
BASOPHILS NFR BLD: 1 % (ref 0–2)
BILIRUB SERPL-MCNC: 0.4 MG/DL (ref 0.2–1)
BUN SERPL-MCNC: 13 MG/DL (ref 7–18)
BUN/CREAT SERPL: 16 (ref 12–20)
CALCIUM SERPL-MCNC: 9.2 MG/DL (ref 8.5–10.1)
CHLORIDE SERPL-SCNC: 105 MMOL/L (ref 100–111)
CHOLEST SERPL-MCNC: 204 MG/DL
CO2 SERPL-SCNC: 32 MMOL/L (ref 21–32)
CREAT SERPL-MCNC: 0.81 MG/DL (ref 0.6–1.3)
DIFFERENTIAL METHOD BLD: ABNORMAL
EOSINOPHIL # BLD: 0.1 K/UL (ref 0–0.4)
EOSINOPHIL NFR BLD: 1 % (ref 0–5)
ERYTHROCYTE [DISTWIDTH] IN BLOOD BY AUTOMATED COUNT: 13.6 % (ref 11.6–14.5)
EST. AVERAGE GLUCOSE BLD GHB EST-MCNC: 97 MG/DL
GLOBULIN SER CALC-MCNC: 2.7 G/DL (ref 2–4)
GLUCOSE SERPL-MCNC: 92 MG/DL (ref 74–99)
HBA1C MFR BLD: 5 % (ref 4.2–5.6)
HCT VFR BLD AUTO: 45.2 % (ref 35–45)
HDLC SERPL-MCNC: 42 MG/DL (ref 40–60)
HDLC SERPL: 4.9 {RATIO} (ref 0–5)
HGB BLD-MCNC: 14.4 G/DL (ref 12–16)
LDLC SERPL CALC-MCNC: 102.6 MG/DL (ref 0–100)
LIPID PROFILE,FLP: ABNORMAL
LYMPHOCYTES # BLD: 1.5 K/UL (ref 0.9–3.6)
LYMPHOCYTES NFR BLD: 33 % (ref 21–52)
MCH RBC QN AUTO: 30.1 PG (ref 24–34)
MCHC RBC AUTO-ENTMCNC: 31.9 G/DL (ref 31–37)
MCV RBC AUTO: 94.6 FL (ref 74–97)
MONOCYTES # BLD: 0.4 K/UL (ref 0.05–1.2)
MONOCYTES NFR BLD: 8 % (ref 3–10)
NEUTS SEG # BLD: 2.6 K/UL (ref 1.8–8)
NEUTS SEG NFR BLD: 57 % (ref 40–73)
PLATELET # BLD AUTO: 285 K/UL (ref 135–420)
PMV BLD AUTO: 10 FL (ref 9.2–11.8)
POTASSIUM SERPL-SCNC: 5 MMOL/L (ref 3.5–5.5)
PROT SERPL-MCNC: 6.6 G/DL (ref 6.4–8.2)
RBC # BLD AUTO: 4.78 M/UL (ref 4.2–5.3)
SODIUM SERPL-SCNC: 142 MMOL/L (ref 136–145)
TRIGL SERPL-MCNC: 297 MG/DL (ref ?–150)
VLDLC SERPL CALC-MCNC: 59.4 MG/DL
WBC # BLD AUTO: 4.6 K/UL (ref 4.6–13.2)

## 2020-11-06 PROCEDURE — 80053 COMPREHEN METABOLIC PANEL: CPT

## 2020-11-06 PROCEDURE — 99443 PR PHYS/QHP TELEPHONE EVALUATION 21-30 MIN: CPT | Performed by: INTERNAL MEDICINE

## 2020-11-06 PROCEDURE — 80061 LIPID PANEL: CPT

## 2020-11-06 PROCEDURE — 83036 HEMOGLOBIN GLYCOSYLATED A1C: CPT

## 2020-11-06 PROCEDURE — 82306 VITAMIN D 25 HYDROXY: CPT

## 2020-11-06 PROCEDURE — 85025 COMPLETE CBC W/AUTO DIFF WBC: CPT

## 2020-11-06 RX ORDER — METOPROLOL TARTRATE 100 MG/1
50 TABLET ORAL 2 TIMES DAILY
Qty: 90 TAB | Refills: 3 | Status: CANCELLED | OUTPATIENT
Start: 2020-11-06

## 2020-11-06 RX ORDER — PRAVASTATIN SODIUM 40 MG/1
40 TABLET ORAL
Qty: 90 TAB | Refills: 3 | Status: SHIPPED | OUTPATIENT
Start: 2020-11-06

## 2020-11-06 RX ORDER — METOPROLOL TARTRATE 50 MG/1
50 TABLET ORAL 2 TIMES DAILY
Qty: 180 TAB | Refills: 3 | Status: SHIPPED | OUTPATIENT
Start: 2020-11-06

## 2020-11-06 NOTE — PROGRESS NOTES
Yohannes Magaña is a 69 y.o.  female presents today for office visit for noah ovalle. Pt would also like to discuss HTN. 1. Have you been to the ER, urgent care clinic since your last visit? Hospitalized since your last visit? No    2. Have you seen or consulted any other health care providers outside of the [address] [place] [address] since your last visit? Include any pap smears or colon screening. No    Upcoming Appts  none    Health Maintenance reviewed      VORB: No orders of the defined types were placed in this encounter.   Robyn Duong, Sarah Mahan LPN

## 2020-11-06 NOTE — PROGRESS NOTES
Milad Ochoa is a 69 y.o. female, evaluated via audio-only technology on 11/6/2020 for Medication Refill; Hypertension; Results; Irregular Heart Beat; Cholesterol Problem; and Weight Management    Assessment & Plan:   Diagnoses and all orders for this visit:    1. Essential hypertension    2. Mixed hyperlipidemia  -     pravastatin (PRAVACHOL) 40 mg tablet; Take 1 Tab by mouth nightly. 3. Obesity, Class III, BMI 40-49.9 (morbid obesity) ([facility] [address]    4. Atrial fibrillation, unspecified type ([facility] [address]    5. Vitamin D deficiency    Other orders  -     metoprolol tartrate (LOPRESSOR) 50 mg tablet; Take 1 Tab by mouth two (2) times a day. Follow-up and Dispositions    · Return in about 11 months (around 9/28/2021) for Medicare wellness. Subjective:      Hypertension   This is a chronic problem. BP is at goal. Pt takes metoprolol tartrate and furosemide. Pt reports compliance with these medications.      Atrial fibrillation  This is a chronic problem. This is at goal. Pt is on clopidogrel, warfarin and a metoprolol tartrate. Pt has a cardiologist and cardiac surgeon.      Obesity Class III/Hyperlipidemia  This is a chronic problem. This is not at goal. Pt was on the ketogenic/IF diet. Pt takes pravastatin.     3 most recent PHQ Screens 9/8/2020   PHQ Not Done -   Little interest or pleasure in doing things Not at all   Feeling down, depressed, irritable, or hopeless Not at all   Total Score PHQ 2 0     Prior to Admission medications    Medication Sig Start Date End Date Taking? Authorizing Provider   pravastatin (PRAVACHOL) 40 mg tablet Take 1 Tab by mouth nightly. 11/6/20  Yes Teto Hardy MD   metoprolol tartrate (LOPRESSOR) 50 mg tablet Take 1 Tab by mouth two (2) times a day. 11/6/20  Yes Teto Hardy MD   ergocalciferol, vitamin D2, (VITAMIN D2 PO) Take 600 mg by mouth.    Yes Provider, Historical   clopidogreL (PLAVIX) 75 mg tab  9/1/20  Yes Provider, Historical   Narcan 4 mg/actuation nasal spray  9/1/20  Yes Provider, Historical   oxyCODONE IR (ROXICODONE) 5 mg immediate release tablet  9/1/20  Yes Provider, Historical   SENNA-DOCUSATE SODIUM PO Take  by mouth. Yes Provider, Historical   furosemide (LASIX) 20 mg tablet Take 20 mg by mouth daily. 7/7/20  Yes Provider, Historical   warfarin (COUMADIN) 2.5 mg tablet Take 1 Tab by mouth daily. 2/28/19  Yes Melissa Rojo MD   acetaminophen (TYLENOL) 650 mg TbER Take 2 Tabs by mouth every eight (8) hours. 2/28/19  Yes Melissa Rojo MD   cyclobenzaprine (FLEXERIL) 10 mg tablet Take 1 Tab by mouth three (3) times daily as needed for Muscle Spasm(s). 1/31/19  Yes Melissa Rojo MD     Patient Active Problem List   Diagnosis Code    Aortic valve stenosis I35.0    Atrial fibrillation ([facility] [address] I48.91    Essential hypertension I10    Obesity, Class III, BMI 40-49.9 (morbid obesity) ([facility] [address] E66.01    Osteoarthritis M19.90    Sick sinus syndrome ([facility] [address] I49.5    Mixed hyperlipidemia E78.2    Vitamin D deficiency E55.9     ROS  CV: no chest pain or palpitation  RESP: no cough or SOB    Patient-Reported Vitals 11/6/2020   Patient-Reported Weight 235 lb      Willie Akbar, who was evaluated through a patient-initiated, synchronous (real-time) audio only encounter, and/or her healthcare decision maker, is aware that it is a billable service, with coverage as determined by her insurance carrier. She provided verbal consent to proceed: Yes. She has not had a related appointment within my department in the past 7 days or scheduled within the next 24 hours.     Total Time: minutes: 21-30 minutes    Heriberto Velázquez MD

## 2020-11-19 NOTE — PATIENT INSTRUCTIONS

## 2021-01-04 LAB
INR, EXTERNAL: 2.05
PT, EXTERNAL: 20.7
